# Patient Record
Sex: MALE | Race: WHITE | Employment: OTHER | URBAN - METROPOLITAN AREA
[De-identification: names, ages, dates, MRNs, and addresses within clinical notes are randomized per-mention and may not be internally consistent; named-entity substitution may affect disease eponyms.]

---

## 2018-01-29 ENCOUNTER — HOSPITAL ENCOUNTER (OUTPATIENT)
Dept: GENERAL RADIOLOGY | Age: 73
Discharge: HOME | End: 2018-01-29
Attending: FAMILY MEDICINE
Payer: MEDICARE

## 2018-01-29 DIAGNOSIS — R05.9 COUGH: ICD-10-CM

## 2018-01-29 PROCEDURE — 71046 X-RAY EXAM CHEST 2 VIEWS: CPT

## 2018-12-27 ENCOUNTER — APPOINTMENT (OUTPATIENT)
Dept: CT IMAGING | Age: 73
End: 2018-12-27
Attending: EMERGENCY MEDICINE
Payer: MEDICARE

## 2018-12-27 ENCOUNTER — HOSPITAL ENCOUNTER (OUTPATIENT)
Age: 73
Setting detail: OBSERVATION
Discharge: HOME | End: 2018-12-29
Attending: EMERGENCY MEDICINE | Admitting: INTERNAL MEDICINE
Payer: MEDICARE

## 2018-12-27 ENCOUNTER — APPOINTMENT (OUTPATIENT)
Dept: GENERAL RADIOLOGY | Age: 73
End: 2018-12-27
Attending: EMERGENCY MEDICINE
Payer: MEDICARE

## 2018-12-27 DIAGNOSIS — H53.2 DIPLOPIA: Primary | ICD-10-CM

## 2018-12-27 DIAGNOSIS — H49.01 THIRD (OCULOMOTOR) NERVE PALSY, RIGHT EYE: ICD-10-CM

## 2018-12-27 LAB
ALBUMIN SERPL-MCNC: 4 G/DL (ref 3.5–5)
ALBUMIN/GLOB SERPL: 1 {RATIO} (ref 1.1–2.2)
ALP SERPL-CCNC: 59 U/L (ref 45–117)
ALT SERPL-CCNC: 34 U/L (ref 12–78)
ANION GAP SERPL CALC-SCNC: 9 MMOL/L (ref 5–15)
AST SERPL-CCNC: 29 U/L (ref 15–37)
ATRIAL RATE: 76 BPM
BASOPHILS # BLD: 0 K/UL (ref 0–0.1)
BASOPHILS NFR BLD: 0 % (ref 0–1)
BILIRUB SERPL-MCNC: 1.6 MG/DL (ref 0.2–1)
BUN SERPL-MCNC: 14 MG/DL (ref 6–20)
BUN/CREAT SERPL: 16 (ref 12–20)
CALCIUM SERPL-MCNC: 9.3 MG/DL (ref 8.5–10.1)
CALCULATED P AXIS, ECG09: 48 DEGREES
CALCULATED R AXIS, ECG10: 56 DEGREES
CALCULATED T AXIS, ECG11: 54 DEGREES
CHLORIDE SERPL-SCNC: 103 MMOL/L (ref 97–108)
CO2 SERPL-SCNC: 25 MMOL/L (ref 21–32)
COMMENT, HOLDF: NORMAL
CREAT SERPL-MCNC: 0.9 MG/DL (ref 0.7–1.3)
DIAGNOSIS, 93000: NORMAL
DIFFERENTIAL METHOD BLD: NORMAL
EOSINOPHIL # BLD: 0 K/UL (ref 0–0.4)
EOSINOPHIL NFR BLD: 1 % (ref 0–7)
ERYTHROCYTE [DISTWIDTH] IN BLOOD BY AUTOMATED COUNT: 12.5 % (ref 11.5–14.5)
GLOBULIN SER CALC-MCNC: 4.1 G/DL (ref 2–4)
GLUCOSE SERPL-MCNC: 99 MG/DL (ref 65–100)
HCT VFR BLD AUTO: 47.5 % (ref 36.6–50.3)
HGB BLD-MCNC: 15.8 G/DL (ref 12.1–17)
IMM GRANULOCYTES # BLD: 0 K/UL (ref 0–0.04)
IMM GRANULOCYTES NFR BLD AUTO: 0 % (ref 0–0.5)
LYMPHOCYTES # BLD: 2.3 K/UL (ref 0.8–3.5)
LYMPHOCYTES NFR BLD: 35 % (ref 12–49)
MCH RBC QN AUTO: 30.4 PG (ref 26–34)
MCHC RBC AUTO-ENTMCNC: 33.3 G/DL (ref 30–36.5)
MCV RBC AUTO: 91.3 FL (ref 80–99)
MONOCYTES # BLD: 0.5 K/UL (ref 0–1)
MONOCYTES NFR BLD: 8 % (ref 5–13)
NEUTS SEG # BLD: 3.8 K/UL (ref 1.8–8)
NEUTS SEG NFR BLD: 56 % (ref 32–75)
NRBC # BLD: 0 K/UL (ref 0–0.01)
NRBC BLD-RTO: 0 PER 100 WBC
P-R INTERVAL, ECG05: 154 MS
PLATELET # BLD AUTO: 182 K/UL (ref 150–400)
PMV BLD AUTO: 9.1 FL (ref 8.9–12.9)
POTASSIUM SERPL-SCNC: 3.7 MMOL/L (ref 3.5–5.1)
PROT SERPL-MCNC: 8.1 G/DL (ref 6.4–8.2)
Q-T INTERVAL, ECG07: 408 MS
QRS DURATION, ECG06: 78 MS
QTC CALCULATION (BEZET), ECG08: 459 MS
RBC # BLD AUTO: 5.2 M/UL (ref 4.1–5.7)
SAMPLES BEING HELD,HOLD: NORMAL
SODIUM SERPL-SCNC: 137 MMOL/L (ref 136–145)
TROPONIN I SERPL-MCNC: <0.05 NG/ML
VENTRICULAR RATE, ECG03: 76 BPM
WBC # BLD AUTO: 6.7 K/UL (ref 4.1–11.1)

## 2018-12-27 PROCEDURE — 71045 X-RAY EXAM CHEST 1 VIEW: CPT

## 2018-12-27 PROCEDURE — 70450 CT HEAD/BRAIN W/O DYE: CPT

## 2018-12-27 PROCEDURE — 85025 COMPLETE CBC W/AUTO DIFF WBC: CPT

## 2018-12-27 PROCEDURE — 84484 ASSAY OF TROPONIN QUANT: CPT

## 2018-12-27 PROCEDURE — 96372 THER/PROPH/DIAG INJ SC/IM: CPT

## 2018-12-27 PROCEDURE — 93005 ELECTROCARDIOGRAM TRACING: CPT

## 2018-12-27 PROCEDURE — 74011250636 HC RX REV CODE- 250/636: Performed by: INTERNAL MEDICINE

## 2018-12-27 PROCEDURE — 80053 COMPREHEN METABOLIC PANEL: CPT

## 2018-12-27 PROCEDURE — 36415 COLL VENOUS BLD VENIPUNCTURE: CPT

## 2018-12-27 PROCEDURE — 65390000012 HC CONDITION CODE 44 OBSERVATION

## 2018-12-27 PROCEDURE — 99284 EMERGENCY DEPT VISIT MOD MDM: CPT

## 2018-12-27 PROCEDURE — 65660000000 HC RM CCU STEPDOWN

## 2018-12-27 RX ORDER — VITAMIN E 268 MG
400 CAPSULE ORAL DAILY
COMMUNITY
End: 2018-12-29

## 2018-12-27 RX ORDER — PANTOPRAZOLE SODIUM 40 MG/1
40 TABLET, DELAYED RELEASE ORAL DAILY
Status: DISCONTINUED | OUTPATIENT
Start: 2018-12-28 | End: 2018-12-29 | Stop reason: HOSPADM

## 2018-12-27 RX ORDER — TAMSULOSIN HYDROCHLORIDE 0.4 MG/1
0.4 CAPSULE ORAL DAILY
Status: DISCONTINUED | OUTPATIENT
Start: 2018-12-28 | End: 2018-12-29 | Stop reason: HOSPADM

## 2018-12-27 RX ORDER — ASPIRIN 325 MG
325 TABLET ORAL DAILY
Status: DISCONTINUED | OUTPATIENT
Start: 2018-12-28 | End: 2018-12-28

## 2018-12-27 RX ORDER — SODIUM CHLORIDE 0.9 % (FLUSH) 0.9 %
5-10 SYRINGE (ML) INJECTION AS NEEDED
Status: DISCONTINUED | OUTPATIENT
Start: 2018-12-27 | End: 2018-12-29 | Stop reason: HOSPADM

## 2018-12-27 RX ORDER — ASCORBIC ACID 500 MG
500 TABLET ORAL DAILY
COMMUNITY
End: 2018-12-29

## 2018-12-27 RX ORDER — DEXTROSE MONOHYDRATE AND SODIUM CHLORIDE 5; .45 G/100ML; G/100ML
75 INJECTION, SOLUTION INTRAVENOUS CONTINUOUS
Status: DISCONTINUED | OUTPATIENT
Start: 2018-12-27 | End: 2018-12-27

## 2018-12-27 RX ORDER — SODIUM CHLORIDE 9 MG/ML
75 INJECTION, SOLUTION INTRAVENOUS CONTINUOUS
Status: DISPENSED | OUTPATIENT
Start: 2018-12-27 | End: 2018-12-28

## 2018-12-27 RX ORDER — ACETAMINOPHEN 325 MG/1
650 TABLET ORAL
Status: DISCONTINUED | OUTPATIENT
Start: 2018-12-27 | End: 2018-12-29 | Stop reason: HOSPADM

## 2018-12-27 RX ORDER — ADHESIVE BANDAGE
30 BANDAGE TOPICAL DAILY PRN
Status: DISCONTINUED | OUTPATIENT
Start: 2018-12-27 | End: 2018-12-29 | Stop reason: HOSPADM

## 2018-12-27 RX ORDER — ONDANSETRON 2 MG/ML
4 INJECTION INTRAMUSCULAR; INTRAVENOUS
Status: DISCONTINUED | OUTPATIENT
Start: 2018-12-27 | End: 2018-12-29 | Stop reason: HOSPADM

## 2018-12-27 RX ORDER — ENOXAPARIN SODIUM 100 MG/ML
40 INJECTION SUBCUTANEOUS EVERY 24 HOURS
Status: DISCONTINUED | OUTPATIENT
Start: 2018-12-27 | End: 2018-12-29 | Stop reason: HOSPADM

## 2018-12-27 RX ORDER — ACETAMINOPHEN 650 MG/1
650 SUPPOSITORY RECTAL
Status: DISCONTINUED | OUTPATIENT
Start: 2018-12-27 | End: 2018-12-29 | Stop reason: HOSPADM

## 2018-12-27 RX ORDER — ATORVASTATIN CALCIUM 20 MG/1
20 TABLET, FILM COATED ORAL DAILY
Status: DISCONTINUED | OUTPATIENT
Start: 2018-12-28 | End: 2018-12-29 | Stop reason: HOSPADM

## 2018-12-27 RX ORDER — LANOLIN ALCOHOL/MO/W.PET/CERES
400 CREAM (GRAM) TOPICAL DAILY
COMMUNITY

## 2018-12-27 RX ORDER — SODIUM CHLORIDE 0.9 % (FLUSH) 0.9 %
5-10 SYRINGE (ML) INJECTION EVERY 8 HOURS
Status: DISCONTINUED | OUTPATIENT
Start: 2018-12-27 | End: 2018-12-29 | Stop reason: HOSPADM

## 2018-12-27 RX ORDER — LEVOTHYROXINE SODIUM 112 UG/1
112 TABLET ORAL
Status: DISCONTINUED | OUTPATIENT
Start: 2018-12-28 | End: 2018-12-29 | Stop reason: HOSPADM

## 2018-12-27 RX ADMIN — ENOXAPARIN SODIUM 40 MG: 40 INJECTION SUBCUTANEOUS at 22:30

## 2018-12-27 RX ADMIN — Medication 10 ML: at 22:30

## 2018-12-27 NOTE — ED TRIAGE NOTES
Triage Note: Patient is coming in with double vision for 3 days. Patient was seen at [address] [place] [address] and had eye dilated. Patient is being sent in for stroke work up.

## 2018-12-27 NOTE — ED PROVIDER NOTES
Petra Severino is a 72 y.o. male who presents ambulatory to the ED with a c/o double vision today. Pt denies any eye pain or redness but states that his sx began shortly after Saeed. Pt reports that today he went to see his PCP for his sx and was sent to [facility] and was told that he had Diplopia. He states that when he covers his right eye with a patch, his vision improves. Pt specifically denies chest pain, shortness of breath, n/v,d , fever, chills, numbness, tingling, abdominal pain, back pain, cough, leg swelling, dizziness or any other acute sx. Pt denies any recent travel, known sick contacts, or recent illness. PCP: Aramis Loco MD  PMHx significant for: Hypothyroidism, GERD, HLD, HTN, Arthritis, IH  PSHx significant for: CABG  Social Hx: Tobacco: Former smoker EtOH: socially Illicit drug use: none    There are no further complaints or symptoms at this time. Signed by: Ade Farrell scribing for Niles Metcalf MD on December 27th, 2018 at 18:41pm.               Past Medical History:   Diagnosis Date    Arthritis 8/20/2012    GERD (gastroesophageal reflux disease) 8/20/2012    HTN (hypertension) 8/20/2012    Hyperlipemia 8/20/2012    IH (inguinal hernia) 2/22/2015 2015, small right , reducible IH .      Thyroid disorder 8/20/2012       Past Surgical History:   Procedure Laterality Date    HX CORONARY ARTERY BYPASS GRAFT           Family History:   Problem Relation Age of Onset    Cancer Father     Cancer Maternal Uncle         prostate    Cancer Paternal Aunt     Cancer Paternal Uncle     Heart Disease Neg Hx        Social History     Socioeconomic History    Marital status: SINGLE     Spouse name: Not on file    Number of children: Not on file    Years of education: Not on file    Highest education level: Not on file   Social Needs    Financial resource strain: Not on file    Food insecurity - worry: Not on file    Food insecurity - inability: Not on file   [place] needs - medical: Not on file   [place] needs - non-medical: Not on file   Occupational History    Not on file   Tobacco Use    Smoking status: Former Smoker     Types: Cigarettes    Smokeless tobacco: Never Used   Substance and Sexual Activity    Alcohol use: Yes     Comment: Social drinker only     Drug use: Not on file    Sexual activity: Not on file   Other Topics Concern    Not on file   Social History Narrative    Not on file         ALLERGIES: Patient has no known allergies. Review of Systems   Constitutional: Negative for chills and fever. Eyes: Positive for visual disturbance. Respiratory: Negative for shortness of breath. Cardiovascular: Negative for chest pain. Gastrointestinal: Negative for abdominal pain, constipation, diarrhea and vomiting. Neurological: Negative for dizziness and light-headedness. All other systems reviewed and are negative. Vitals:    12/27/18 1612 12/27/18 1640   BP:  137/75   Pulse: 89 71   Resp:  20   Temp:  98 °F (36.7 °C)   SpO2: 98% 98%   Weight:  86.6 kg (191 lb)   Height:  5' 6\" (1.676 m)            Physical Exam   Constitutional: He is oriented to person, place, and time. He appears well-developed and well-nourished. No distress. NAD, AxOx4, speaking in complete sentences, wearing an eye patch (no double vision w/ patch on)   HENT:   Head: Normocephalic and atraumatic. Right Ear: External ear normal.   Left Ear: External ear normal.   Mouth/Throat: Oropharynx is clear and moist. No oropharyngeal exudate. Cn intact    No facial droop/ slurred speech/ tongue deviation   Eyes: Conjunctivae and EOM are normal. Pupils are equal, round, and reactive to light. Right eye exhibits no discharge. Left eye exhibits no discharge. No scleral icterus. Diplopia w/ patch removal, 'bad'; Less so w/ patch on   Neck: Normal range of motion. Neck supple.    Cardiovascular: Normal rate, regular rhythm, normal heart sounds and intact distal pulses. Exam reveals no gallop and no friction rub. No murmur heard. Pulmonary/Chest: Effort normal and breath sounds normal. No stridor. No respiratory distress. He has no wheezes. He has no rales. He exhibits no tenderness. Abdominal: Soft. Bowel sounds are normal. He exhibits no distension and no mass. There is no tenderness. There is no rebound and no guarding. nttp   Genitourinary:   Genitourinary Comments: Pt denies urinary/ Testicular/ scrotal or penile  complaints   Musculoskeletal: Normal range of motion. He exhibits no edema, tenderness or deformity. Lymphadenopathy:     He has no cervical adenopathy. Neurological: He is alert and oriented to person, place, and time. No cranial nerve deficit. Coordination normal.   pt has motor/ CV/ Sensation grossly intact to all extremities, R = L in strength;   Skin: Skin is warm and dry. Capillary refill takes less than 2 seconds. No rash noted. No erythema. Psychiatric: He has a normal mood and affect. Nursing note and vitals reviewed. MDM       Procedures    Chief Complaint   Patient presents with    Double Vision       7:31 PM  The patients presenting problems have been discussed, and they are in agreement with the care plan formulated and outlined with them. I have encouraged them to ask questions as they arise throughout their visit.     MEDICATIONS GIVEN:  Medications - No data to display    LABS REVIEWED:  Labs Reviewed   METABOLIC PANEL, COMPREHENSIVE - Abnormal; Notable for the following components:       Result Value    Bilirubin, total 1.6 (*)     Globulin 4.1 (*)     A-G Ratio 1.0 (*)     All other components within normal limits   CBC WITH AUTOMATED DIFF   SAMPLES BEING HELD   TROPONIN I       RADIOLOGY RESULTS:  The following have been ordered and reviewed:  _____________________________________________________________________  _____________________________________________________________________    EKG interpretation:   Rhythm: normal sinus rhythm; and regular . Rate (approx.): 76; Axis: normal; P wave: normal; QRS interval: normal ; ST/T wave: normal; Negative acute significant segmental elevations/ unchanged compared to 05/19/2015    PROCEDURES:        CONSULTATIONS:       PROGRESS NOTES:      DIAGNOSIS:    1. Diplopia        PLAN:  1-admit for cva rule out;       ED COURSE: The patients hospital course has been uncomplicated. 7:36 PM  Patient is being evaluated for admission to the hospital by the hospitalist.  The results of their tests and reasons for their admission have been discussed with them and/or available family. They convey agreement and understanding for the need to be admitted and for their admission diagnosis. Consultation has been made with the inpatient physician specialist for hospitalization.

## 2018-12-28 ENCOUNTER — APPOINTMENT (OUTPATIENT)
Dept: MRI IMAGING | Age: 73
End: 2018-12-28
Attending: PSYCHIATRY & NEUROLOGY
Payer: MEDICARE

## 2018-12-28 ENCOUNTER — APPOINTMENT (OUTPATIENT)
Dept: CT IMAGING | Age: 73
End: 2018-12-28
Attending: PSYCHIATRY & NEUROLOGY
Payer: MEDICARE

## 2018-12-28 ENCOUNTER — APPOINTMENT (OUTPATIENT)
Dept: MRI IMAGING | Age: 73
End: 2018-12-28
Attending: INTERNAL MEDICINE
Payer: MEDICARE

## 2018-12-28 LAB
CHOLEST SERPL-MCNC: 145 MG/DL
CRP SERPL HS-MCNC: 1.1 MG/L
ERYTHROCYTE [DISTWIDTH] IN BLOOD BY AUTOMATED COUNT: 12.6 % (ref 11.5–14.5)
ERYTHROCYTE [SEDIMENTATION RATE] IN BLOOD: 8 MM/HR (ref 0–20)
EST. AVERAGE GLUCOSE BLD GHB EST-MCNC: 131 MG/DL
HBA1C MFR BLD: 6.2 % (ref 4.2–6.3)
HCT VFR BLD AUTO: 46.5 % (ref 36.6–50.3)
HDLC SERPL-MCNC: 35 MG/DL
HDLC SERPL: 4.1 {RATIO} (ref 0–5)
HGB BLD-MCNC: 15.9 G/DL (ref 12.1–17)
LDLC SERPL CALC-MCNC: 66.2 MG/DL (ref 0–100)
LIPID PROFILE,FLP: ABNORMAL
MCH RBC QN AUTO: 31.2 PG (ref 26–34)
MCHC RBC AUTO-ENTMCNC: 34.2 G/DL (ref 30–36.5)
MCV RBC AUTO: 91.4 FL (ref 80–99)
NRBC # BLD: 0 K/UL (ref 0–0.01)
NRBC BLD-RTO: 0 PER 100 WBC
PLATELET # BLD AUTO: 187 K/UL (ref 150–400)
PMV BLD AUTO: 9.5 FL (ref 8.9–12.9)
RBC # BLD AUTO: 5.09 M/UL (ref 4.1–5.7)
T4 FREE SERPL-MCNC: 1.5 NG/DL (ref 0.8–1.5)
TRIGL SERPL-MCNC: 219 MG/DL (ref ?–150)
TSH SERPL DL<=0.05 MIU/L-ACNC: 2.75 UIU/ML (ref 0.36–3.74)
VLDLC SERPL CALC-MCNC: 43.8 MG/DL
WBC # BLD AUTO: 6.7 K/UL (ref 4.1–11.1)

## 2018-12-28 PROCEDURE — 70551 MRI BRAIN STEM W/O DYE: CPT

## 2018-12-28 PROCEDURE — G8978 MOBILITY CURRENT STATUS: HCPCS

## 2018-12-28 PROCEDURE — 70544 MR ANGIOGRAPHY HEAD W/O DYE: CPT

## 2018-12-28 PROCEDURE — 83036 HEMOGLOBIN GLYCOSYLATED A1C: CPT

## 2018-12-28 PROCEDURE — G8989 SELF CARE D/C STATUS: HCPCS

## 2018-12-28 PROCEDURE — 74011636320 HC RX REV CODE- 636/320: Performed by: RADIOLOGY

## 2018-12-28 PROCEDURE — G8979 MOBILITY GOAL STATUS: HCPCS

## 2018-12-28 PROCEDURE — 84443 ASSAY THYROID STIM HORMONE: CPT

## 2018-12-28 PROCEDURE — G8987 SELF CARE CURRENT STATUS: HCPCS

## 2018-12-28 PROCEDURE — 86141 C-REACTIVE PROTEIN HS: CPT

## 2018-12-28 PROCEDURE — 99218 HC RM OBSERVATION: CPT

## 2018-12-28 PROCEDURE — 65270000029 HC RM PRIVATE

## 2018-12-28 PROCEDURE — 96361 HYDRATE IV INFUSION ADD-ON: CPT

## 2018-12-28 PROCEDURE — 74011250636 HC RX REV CODE- 250/636: Performed by: INTERNAL MEDICINE

## 2018-12-28 PROCEDURE — 97116 GAIT TRAINING THERAPY: CPT

## 2018-12-28 PROCEDURE — 93306 TTE W/DOPPLER COMPLETE: CPT

## 2018-12-28 PROCEDURE — 93880 EXTRACRANIAL BILAT STUDY: CPT

## 2018-12-28 PROCEDURE — G8980 MOBILITY D/C STATUS: HCPCS

## 2018-12-28 PROCEDURE — 74011250637 HC RX REV CODE- 250/637: Performed by: INTERNAL MEDICINE

## 2018-12-28 PROCEDURE — 97165 OT EVAL LOW COMPLEX 30 MIN: CPT

## 2018-12-28 PROCEDURE — 65390000012 HC CONDITION CODE 44 OBSERVATION

## 2018-12-28 PROCEDURE — 74011250637 HC RX REV CODE- 250/637: Performed by: PSYCHIATRY & NEUROLOGY

## 2018-12-28 PROCEDURE — 97535 SELF CARE MNGMENT TRAINING: CPT

## 2018-12-28 PROCEDURE — 85652 RBC SED RATE AUTOMATED: CPT

## 2018-12-28 PROCEDURE — 85027 COMPLETE CBC AUTOMATED: CPT

## 2018-12-28 PROCEDURE — 96360 HYDRATION IV INFUSION INIT: CPT

## 2018-12-28 PROCEDURE — 97161 PT EVAL LOW COMPLEX 20 MIN: CPT

## 2018-12-28 PROCEDURE — 70496 CT ANGIOGRAPHY HEAD: CPT

## 2018-12-28 PROCEDURE — 84439 ASSAY OF FREE THYROXINE: CPT

## 2018-12-28 PROCEDURE — 80061 LIPID PANEL: CPT

## 2018-12-28 PROCEDURE — 74011000258 HC RX REV CODE- 258: Performed by: RADIOLOGY

## 2018-12-28 PROCEDURE — 36415 COLL VENOUS BLD VENIPUNCTURE: CPT

## 2018-12-28 PROCEDURE — G8988 SELF CARE GOAL STATUS: HCPCS

## 2018-12-28 RX ORDER — SODIUM CHLORIDE 0.9 % (FLUSH) 0.9 %
10 SYRINGE (ML) INJECTION
Status: ACTIVE | OUTPATIENT
Start: 2018-12-28 | End: 2018-12-28

## 2018-12-28 RX ORDER — GADOTERATE MEGLUMINE 376.9 MG/ML
17 INJECTION INTRAVENOUS
Status: ACTIVE | OUTPATIENT
Start: 2018-12-28 | End: 2018-12-28

## 2018-12-28 RX ORDER — CLOPIDOGREL BISULFATE 75 MG/1
75 TABLET ORAL DAILY
Status: DISCONTINUED | OUTPATIENT
Start: 2018-12-28 | End: 2018-12-29 | Stop reason: HOSPADM

## 2018-12-28 RX ORDER — SODIUM CHLORIDE 0.9 % (FLUSH) 0.9 %
10 SYRINGE (ML) INJECTION
Status: COMPLETED | OUTPATIENT
Start: 2018-12-28 | End: 2018-12-28

## 2018-12-28 RX ADMIN — SODIUM CHLORIDE 100 ML: 900 INJECTION, SOLUTION INTRAVENOUS at 15:56

## 2018-12-28 RX ADMIN — CLOPIDOGREL BISULFATE 75 MG: 75 TABLET ORAL at 16:31

## 2018-12-28 RX ADMIN — SODIUM CHLORIDE 75 ML/HR: 900 INJECTION, SOLUTION INTRAVENOUS at 00:48

## 2018-12-28 RX ADMIN — ATORVASTATIN CALCIUM 20 MG: 20 TABLET, FILM COATED ORAL at 10:22

## 2018-12-28 RX ADMIN — Medication 10 ML: at 15:56

## 2018-12-28 RX ADMIN — ENOXAPARIN SODIUM 40 MG: 40 INJECTION SUBCUTANEOUS at 22:40

## 2018-12-28 RX ADMIN — LEVOTHYROXINE SODIUM 112 MCG: 112 TABLET ORAL at 06:49

## 2018-12-28 RX ADMIN — Medication 10 ML: at 14:38

## 2018-12-28 RX ADMIN — PANTOPRAZOLE SODIUM 40 MG: 40 TABLET, DELAYED RELEASE ORAL at 10:22

## 2018-12-28 RX ADMIN — ASPIRIN 325 MG: 325 TABLET ORAL at 10:22

## 2018-12-28 RX ADMIN — Medication 10 ML: at 06:49

## 2018-12-28 RX ADMIN — TAMSULOSIN HYDROCHLORIDE 0.4 MG: 0.4 CAPSULE ORAL at 10:22

## 2018-12-28 RX ADMIN — IOPAMIDOL 100 ML: 755 INJECTION, SOLUTION INTRAVENOUS at 15:56

## 2018-12-28 RX ADMIN — Medication 10 ML: at 22:40

## 2018-12-28 NOTE — PROGRESS NOTES
Consult received and appreciated. Reviewed chart and discussed case with nsg. Nsg dysphagia screen completed and WNL and patient is tolerating a diet without difficulty. MRI negative for acute infarct and no concerns regarding speech or swallowing function. Formal SLP evaluation is not clinically indicated at this time. Please re-consult if further needs arise. Thanks. Tesfaye Alcaraz M.CD.  CCC-SLP

## 2018-12-28 NOTE — PROCEDURES
[facility]  *** FINAL REPORT ***    Name: Kaylene Murrell  MRN: [id number]    Inpatient  : 1945  HIS Order #: [id number]  [id number] [facility] Visit #: [id number]  Date: 28 Dec 2018    TYPE OF TEST: Cerebrovascular Duplex    REASON FOR TEST  Cerebrovascular accident    Right Carotid:-             Proximal               Mid                 Distal  cm/s  Systolic  Diastolic  Systolic  Diastolic  Systolic  Diastolic  CCA:    131.5      15.0                            91.0      18.0  Bulb:  ECA:    107.0  ICA:    124.0      40.0      114.0      22.0       75.0      19.0  ICA/CCA:  1.4       2.2    ICA Stenosis:    Right Vertebral:-  Finding: Antegrade  Sys:       50.0  Gloria:    Right Subclavian:    Left Carotid:-            Proximal                Mid                 Distal  cm/s  Systolic  Diastolic  Systolic  Diastolic  Systolic  Diastolic  CCA:     89.0      18.0                           105.0      20.0  Bulb:  ECA:    128.0  ICA:    138.0      23.0      120.0      22.0       75.0      16.0  ICA/CCA:  1.3       1.2    ICA Stenosis:    Left Vertebral:-  Finding: Antegrade  Sys:       60.0  Gloria:    Left Subclavian:    INTERPRETATION/FINDINGS  PROCEDURE:  Color duplex ultrasound imaging of extracranial  cerebrovascular arteries. FINDINGS:       Right:  Internal carotid velocity is elevated to a level  consistent with borderline 50%  percent stenosis; there is narrowing  of the flow channel on color Doppler imaging and mixed density plaque  on B-mode imaging. The common and external carotid arteries are  patent and without evidence of hemodynamically significant stenosis. Left:  Internal carotid velocity is elevated to a level  consistent with a 50 to 69 percent stenosis; there is narrowing of the   flow channel on color Doppler imaging and mixed density plaque on  B-mode imaging.   The common and external carotid arteries are patent  and without evidence of hemodynamically significant stenosis. IMPRESSION:  Consistent with borderline 50% stenosis of the right  internal carotid and 50-69% stenosis (lower end) of the left internal  carotid. Vertebrals are patent with antegrade flow. ADDITIONAL COMMENTS    I have personally reviewed the data relevant to the interpretation of  this  study.     TECHNOLOGIST: Clovis Daigle RVT  Signed: 12/28/2018 08:42 AM    PHYSICIAN: Karena Frias MD  Signed: 12/28/2018 02:08 PM

## 2018-12-28 NOTE — PROGRESS NOTES
Meenakshi Knox, Shekhar Huerta, and ShayAdmit Date: 12/27/2018 Subjective:  
 
Patient admitted for his diplopia as his eye doctor very concerned about an intracerebral event based on eye exam. He feels OK today. No change in diplopia. .. Current Facility-Administered Medications Medication Dose Route Frequency  aspirin tablet 325 mg  325 mg Oral DAILY  atorvastatin (LIPITOR) tablet 20 mg  20 mg Oral DAILY  levothyroxine (SYNTHROID) tablet 112 mcg  112 mcg Oral ACB  pantoprazole (PROTONIX) tablet 40 mg  40 mg Oral DAILY  tamsulosin (FLOMAX) capsule 0.4 mg  0.4 mg Oral DAILY  sodium chloride (NS) flush 5-10 mL  5-10 mL IntraVENous Q8H  
 sodium chloride (NS) flush 5-10 mL  5-10 mL IntraVENous PRN  
 acetaminophen (TYLENOL) tablet 650 mg  650 mg Oral Q4H PRN Or  
 acetaminophen (TYLENOL) solution 650 mg  650 mg Per NG tube Q4H PRN Or  
 acetaminophen (TYLENOL) suppository 650 mg  650 mg Rectal Q4H PRN  
 0.9% sodium chloride infusion  75 mL/hr IntraVENous CONTINUOUS  
 ondansetron (ZOFRAN) injection 4 mg  4 mg IntraVENous Q6H PRN  
 magnesium hydroxide (MILK OF MAGNESIA) 400 mg/5 mL oral suspension 30 mL  30 mL Oral DAILY PRN  
 enoxaparin (LOVENOX) injection 40 mg  40 mg SubCUTAneous Q24H Objective:  
 
Patient Vitals for the past 8 hrs: 
 BP Temp Pulse Resp SpO2 Weight 12/28/18 0650 126/63 98.5 °F (36.9 °C) 85 13 98 %   
12/28/18 0255 136/69 98.1 °F (36.7 °C) 78 18 98 % 181 lb 8 oz (82.3 kg) No intake/output data recorded. No intake/output data recorded. Physical Exam: Lungs: clear to auscultation bilaterally Heart: regular rate and rhythm, S1, S2 normal, no murmur, click, rub or gallop Abdomen: soft, non-tender. Bowel sounds normal. No masses,  no organomegaly Data Review Recent Results (from the past 24 hour(s)) EKG, 12 LEAD, INITIAL Collection Time: 12/27/18  4:26 PM  
Result Value Ref Range  Ventricular Rate 76 BPM  
 Atrial Rate 76 BPM  
 P-R Interval 154 ms QRS Duration 78 ms Q-T Interval 408 ms QTC Calculation (Bezet) 459 ms Calculated P Axis 48 degrees Calculated R Axis 56 degrees Calculated T Axis 54 degrees Diagnosis Normal sinus rhythm No previous ECGs available Confirmed by Christy Mckay ([id number]) on 12/27/2018 5:31:19 PM 
  
CBC WITH AUTOMATED DIFF Collection Time: 12/27/18  4:36 PM  
Result Value Ref Range WBC 6.7 4.1 - 11.1 K/uL  
 RBC 5.20 4. 10 - 5.70 M/uL  
 HGB 15.8 12.1 - 17.0 g/dL HCT 47.5 36.6 - 50.3 % MCV 91.3 80.0 - 99.0 FL  
 MCH 30.4 26.0 - 34.0 PG  
 MCHC 33.3 30.0 - 36.5 g/dL  
 RDW 12.5 11.5 - 14.5 % PLATELET 469 097 - 009 K/uL MPV 9.1 8.9 - 12.9 FL  
 NRBC 0.0 0  WBC ABSOLUTE NRBC 0.00 0.00 - 0.01 K/uL NEUTROPHILS 56 32 - 75 % LYMPHOCYTES 35 12 - 49 % MONOCYTES 8 5 - 13 % EOSINOPHILS 1 0 - 7 % BASOPHILS 0 0 - 1 % IMMATURE GRANULOCYTES 0 0.0 - 0.5 % ABS. NEUTROPHILS 3.8 1.8 - 8.0 K/UL  
 ABS. LYMPHOCYTES 2.3 0.8 - 3.5 K/UL  
 ABS. MONOCYTES 0.5 0.0 - 1.0 K/UL  
 ABS. EOSINOPHILS 0.0 0.0 - 0.4 K/UL  
 ABS. BASOPHILS 0.0 0.0 - 0.1 K/UL  
 ABS. IMM. GRANS. 0.0 0.00 - 0.04 K/UL  
 DF AUTOMATED METABOLIC PANEL, COMPREHENSIVE Collection Time: 12/27/18  4:36 PM  
Result Value Ref Range Sodium 137 136 - 145 mmol/L Potassium 3.7 3.5 - 5.1 mmol/L Chloride 103 97 - 108 mmol/L  
 CO2 25 21 - 32 mmol/L Anion gap 9 5 - 15 mmol/L Glucose 99 65 - 100 mg/dL BUN 14 6 - 20 MG/DL Creatinine 0.90 0.70 - 1.30 MG/DL  
 BUN/Creatinine ratio 16 12 - 20 GFR est AA >60 >60 ml/min/1.73m2 GFR est non-AA >60 >60 ml/min/1.73m2 Calcium 9.3 8.5 - 10.1 MG/DL Bilirubin, total 1.6 (H) 0.2 - 1.0 MG/DL  
 ALT (SGPT) 34 12 - 78 U/L  
 AST (SGOT) 29 15 - 37 U/L Alk. phosphatase 59 45 - 117 U/L Protein, total 8.1 6.4 - 8.2 g/dL Albumin 4.0 3.5 - 5.0 g/dL Globulin 4.1 (H) 2.0 - 4.0 g/dL A-G Ratio 1.0 (L) 1.1 - 2.2 SAMPLES BEING HELD Collection Time: 12/27/18  4:36 PM  
Result Value Ref Range SAMPLES BEING HELD 1RED COMMENT Add-on orders for these samples will be processed based on acceptable specimen integrity and analyte stability, which may vary by analyte. TROPONIN I Collection Time: 12/27/18  4:36 PM  
Result Value Ref Range Troponin-I, Qt. <0.05 <0.05 ng/mL LIPID PANEL Collection Time: 12/28/18  3:13 AM  
Result Value Ref Range LIPID PROFILE Cholesterol, total 145 <200 MG/DL Triglyceride 219 (H) <150 MG/DL  
 HDL Cholesterol 35 MG/DL  
 LDL, calculated 66.2 0 - 100 MG/DL VLDL, calculated 43.8 MG/DL  
 CHOL/HDL Ratio 4.1 0 - 5.0 HEMOGLOBIN A1C WITH EAG Collection Time: 12/28/18  3:13 AM  
Result Value Ref Range Hemoglobin A1c 6.2 4.2 - 6.3 % Est. average glucose 131 mg/dL CBC W/O DIFF Collection Time: 12/28/18  3:13 AM  
Result Value Ref Range WBC 6.7 4.1 - 11.1 K/uL  
 RBC 5.09 4. 10 - 5.70 M/uL  
 HGB 15.9 12.1 - 17.0 g/dL HCT 46.5 36.6 - 50.3 % MCV 91.4 80.0 - 99.0 FL  
 MCH 31.2 26.0 - 34.0 PG  
 MCHC 34.2 30.0 - 36.5 g/dL  
 RDW 12.6 11.5 - 14.5 % PLATELET 127 221 - 703 K/uL MPV 9.5 8.9 - 12.9 FL  
 NRBC 0.0 0  WBC ABSOLUTE NRBC 0.00 0.00 - 0.01 K/uL SED RATE (ESR) Collection Time: 12/28/18  3:13 AM  
Result Value Ref Range Sed rate, automated 8 0 - 20 mm/hr CRP, HIGH SENSITIVITY Collection Time: 12/28/18  3:13 AM  
Result Value Ref Range CRP, High sensitivity 1.1 mg/L  
TSH 3RD GENERATION Collection Time: 12/28/18  3:13 AM  
Result Value Ref Range TSH 2.75 0.36 - 3.74 uIU/mL T4, FREE Collection Time: 12/28/18  3:13 AM  
Result Value Ref Range T4, Free 1.5 0.8 - 1.5 NG/DL Assessment:  
 
Active Problems: 
  Double vision (12/27/2018) Plan:  
 
1) MRI brain today 2) Neurologist to see

## 2018-12-28 NOTE — PROGRESS NOTES
Problem: Falls - Risk of 
Goal: *Absence of Falls Document Payton Primes Fall Risk and appropriate interventions in the flowsheet. Outcome: Progressing Towards Goal 
Fall Risk Interventions: 
  
 
  
 
Medication Interventions: Patient to call before getting OOB, Evaluate medications/consider consulting pharmacy Elimination Interventions: Patient to call for help with toileting needs Problem: Pressure Injury - Risk of 
Goal: *Prevention of pressure injury Document Angel Scale and appropriate interventions in the flowsheet. Outcome: Progressing Towards Goal 
Pressure Injury Interventions: Activity Interventions: Increase time out of bed, Pressure redistribution bed/mattress(bed type), PT/OT evaluation

## 2018-12-28 NOTE — H&P
[address]   HISTORY AND PHYSICAL      Name:RAFAL CAMPO  MR#: [id number]  : 1945  ACCOUNT #: [de-identified]   ADMIT DATE: 2018    TIME OF DICTATION:  2000 hours. PRIMARY CARE PHYSICIAN:  Dr. Francisco Richard. CHIEF COMPLAINT:  Double vision. HISTORY OF PRESENT ILLNESS:  This 61-year-old gentleman with a past medical history significant for primary hypertension, hypothyroidism, dyslipidemia, gastroesophageal reflux disease and arthritis, was referred to the emergency room by his primary care physician for evaluation of  history of double vision of the right eye. The patient noted the symptoms started approximately 4 days prior to the emergency room presentation, went to get further evaluation by his PCP and was informed by  that he had diplopia. Patient also complained of being unsteady whilst walking and as a result was prescribed a right eye patch with some improved vision. The patient denied any headaches, dizziness, chest pains, palpitations, shortness of breath, nausea, vomiting, abdominal pain, bladder or bowel irregularities. PAST MEDICAL HISTORY:  1.  Primary hypertension. 2.  Dyslipidemia. 3.  Hypothyroidism. 4.  Arthritis. 5.  Gastroesophageal reflux disease. PAST SURGICAL HISTORY:  Coronary artery bypass graft surgery. HOME MEDICATIONS:  1. Aspirin 325 mg p.o. b.i.d.  2.  Lipitor 20 mg p.o. daily. 3.  Voltaren 75 mg enteric coated 1 tablet p.o. twice a day as needed. 4.  Guaifenesin/codeine 5 mL p.o. 3 times a day as needed for cough. 5.  Levothyroxine 112 mcg p.o. daily. 6.  Multivitamin 1 tablet p.o. daily. 7.  Omega-3 fatty acids 1 capsule p.o. daily. 8.  Protonix 40 mg 1 tablet p.o. daily. 9.  Flomax 0.4 mg 1 capsule p.o. daily. 10.  Tramadol 50 mg 1 tablet every 6 hours as needed for pain. ALLERGIES:  PATIENT HAS NO KNOWN DRUG ALLERGIES.     SOCIAL HISTORY:  Significant for living at home, is independent of activities and instrumental activities of daily living, is a former smoker. Denies any alcohol use disorder. FAMILY HISTORY:  Reviewed and positive for diabetes on the maternal and paternal sides of the family. REVIEW OF SYSTEMS:  GENERAL:  No recent changes in appetite or weight loss. CONSTITUTIONAL:  No fevers or chills. HEENT:   Positive for double vision in the right eye. CARDIOVASCULAR:  Negative for chest pains or palpitations. RESPIRATORY:  Negative for cough or shortness of breath. GASTROINTESTINAL:  Negative for abdominal pain, nausea, vomiting. GENITOURINARY:  Negative for hematuria, dysuria. MUSCULOSKELETAL:  Negative for any joint pains or stiffness. HEMATOLOGY:  Negative for anemia or bleeding diathesis. NEUROLOGIC:  Negative for any dizziness or generalized weakness. PSYCHIATRIC:  Negative for psychosis, hallucinations or delusions. PHYSICAL EXAMINATION:  GENERAL:  Patient was awake, alert, sitting up in the stretcher. VITAL SIGNS:  Blood pressure 137/75, heart rate 71, respiratory rate 20, afebrile, saturating 98% on room air. HEENT:   Head normocephalic. Pupils equal and reactive to light without any nystagmus. ENT:  Ears, nose, throat clear. NECK:  No jugular venous distention, no carotid bruits. HEART:  S1, S2 present without any murmurs. RESPIRATORY:  Lungs with decreased air entry bilaterally. GASTROINTESTINAL:  Bowel sounds present. The abdomen is soft, nontender, no rebound, no guarding. GENITOURINARY:  Nil of note  MUSCULOSKELETAL:  Power 5/5 in bilateral upper and lower extremities. No significant asymmetry of the lower extremities. No significant pedal edema. CENTRAL NERVOUS SYSTEM:  The patient was awake, alert, oriented to time, place, person. Coordination was slightly impaired. Gait was not assessed. LABORATORY DATA AND RADIOGRAPHIC FINDINGS:  CT of the head without contrast which did not show any acute intracranial findings.   Metabolic panel with a sodium of 137, potassium 3.7, chloride 103, bicarb 25, BUN 14, creatinine 0.9, glucose of 99. Hematology:  CBC with a WBC of 6.7, hemoglobin 15.8, hematocrit 47.5, platelet count 051. ASSESSMENT:  1. Central nervous system:  The patient will be admitted to the inpatient telemetry floor for evaluation of possible CVA with right-sided diplopia. CT of the brain with no contrast negative for any acute findings. We will obtain further neurologic evaluation including MRI of the brain, carotid Dopplers. We will continue aspirin, statin, close neurologic checks and consult neurology service for further recommendations. 2.  Cardiovascular:  Fairly well controlled primary hypertension. Dyslipidemia. 2D ECHO to evaluate LV function. 3.  Gastrointestinal:  History of gastroesophageal reflux disease. 4.  Endocrine:  Hypothyroidism, on levothyroxine. Will check a TSH and adjust dose accordingly. 5.  Musculoskeletal:  Arthritis. 6.  Prophylaxis for DVT. 7.  Directives:  FULL CODE with a guarded prognosis. Discussed with the patient. In summary, a 70-year-old gentleman with a past medical history significant for primary hypertension, coronary artery disease, hypothyroidism, gastroesophageal reflux disease, and arthritis, is being admitted to the inpatient level  telemetry floor for management of probable CVA with residual right-sided diplopia. The patient is at increased risk for further decompensation and will benefit from inpatient management including with an MRI of the brain, carotid Dopplers, 2D echo and Neurology consult. The entire admission plan discussed in detail with the patient and patient's family member at the bedside. All questions and concerns were addressed. TOTAL TIME FOR ADMISSION:  45 minutes of which at least 50% of the time was spent in plan of care and counseling of the patient.       MD FRANCISCO Hummel/  D: 12/27/2018 20:09     T: 12/27/2018 20:44  JOB #: [id number]

## 2018-12-28 NOTE — ROUTINE PROCESS
TRANSFER - OUT REPORT:    Verbal report given to [facility] RN(name) on Petra Brand  being transferred to 665(unit) for routine progression of care       Report consisted of patients Situation, Background, Assessment and   Recommendations(SBAR). Information from the following report(s) SBAR, Kardex, ED Summary, Procedure Summary, Intake/Output, MAR, Recent Results and Med Rec Status was reviewed with the receiving nurse. Lines:   Peripheral IV Left Antecubital (Active)   Site Assessment Clean, dry, & intact 12/27/2018  4:39 PM   Phlebitis Assessment 0 12/27/2018  4:39 PM   Infiltration Assessment 0 12/27/2018  4:39 PM   Dressing Status Clean, dry, & intact 12/27/2018  4:39 PM   Dressing Type Tape;Transparent 12/27/2018  4:39 PM   Hub Color/Line Status Pink;Capped;Flushed;Patent 12/27/2018  4:39 PM   Action Taken Blood drawn 12/27/2018  4:39 PM   Alcohol Cap Used No 12/27/2018  4:39 PM        Opportunity for questions and clarification was provided.       Patient transported with:   Registered Nurse

## 2018-12-28 NOTE — PROGRESS NOTES
Problem: Falls - Risk of 
Goal: *Absence of Falls Document Nathalie Shows Fall Risk and appropriate interventions in the flowsheet. Outcome: Progressing Towards Goal 
Fall Risk Interventions: 
  
 
  
 
Medication Interventions: Evaluate medications/consider consulting pharmacy, Patient to call before getting OOB, Teach patient to arise slowly Elimination Interventions: Patient to call for help with toileting needs, Toileting schedule/hourly rounds Problem: Pressure Injury - Risk of 
Goal: *Prevention of pressure injury Document Angel Scale and appropriate interventions in the flowsheet. Outcome: Progressing Towards Goal 
Pressure Injury Interventions: 
Sensory Interventions: Assess changes in LOC, Assess need for specialty bed, Minimize linen layers Activity Interventions: Increase time out of bed, Pressure redistribution bed/mattress(bed type), PT/OT evaluation

## 2018-12-28 NOTE — ED NOTES
Bedside shift change report given to Cuca Benton RN (oncoming nurse) by Alfredo Crandlal RN  (offgoing nurse). Report included the following information SBAR, Kardex, ED Summary, Intake/Output, MAR and Recent Results.

## 2018-12-28 NOTE — CONSULTS
NEUROLOGY  12/28/2018     Consulted by: Payton Beck MD        Patient ID:  Chhaya Watts  [id number]  72 y.o.  1945    Chief Complaint   Patient presents with    Double Vision       HPI    79-year-old gentleman with history of CABG, hypertension, dyslipidemia who is here for double vision that started the morning of December 25. He tells me he went to [facility] and did not feel very good. He woke up the next morning and suddenly had double vision mostly vertical with occasional diagonal symptoms. Symptoms resolved when he closes one eye. No numbness or weakness or speech change. He feels a little unstable on. No difficulty swallowing. He takes aspirin 325 every day as well as Lipitor. MRI brain was done which was negative for any acute process seen. MRI cannot be done due to poor tolerance of the procedure. Symptoms have not improved since onset. Review of Systems   Constitutional: Positive for malaise/fatigue. Eyes: Positive for double vision. Gastrointestinal: Negative for nausea. Neurological: Negative for sensory change, speech change and focal weakness. All other systems reviewed and are negative. Past Medical History:   Diagnosis Date    Arthritis 8/20/2012    GERD (gastroesophageal reflux disease) 8/20/2012    HTN (hypertension) 8/20/2012    Hyperlipemia 8/20/2012    IH (inguinal hernia) 2/22/2015 2015, small right , reducible IH .      Thyroid disorder 8/20/2012     Family History   Problem Relation Age of Onset    Cancer Father     Cancer Maternal Uncle         prostate    Cancer Paternal Aunt     Cancer Paternal Uncle     Heart Disease Neg Hx      Social History     Socioeconomic History    Marital status: SINGLE     Spouse name: Not on file    Number of children: Not on file    Years of education: Not on file    Highest education level: Not on file   Social Needs    Financial resource strain: Not on file    Food insecurity - worry: Not on file    Food insecurity - inability: Not on file    Transportation needs - medical: Not on file   [employer] needs - non-medical: Not on file   Occupational History    Not on file   Tobacco Use    Smoking status: Former Smoker     Types: Cigarettes    Smokeless tobacco: Never Used   Substance and Sexual Activity    Alcohol use: Yes     Comment: Social drinker only     Drug use: Not on file    Sexual activity: Not on file   Other Topics Concern    Not on file   Social History Narrative    Not on file     Current Facility-Administered Medications   Medication Dose Route Frequency    gadoterate meglumine (DOTAREM) 0.5 mmol/mL (376.9 mg/mL) contrast solution 17 mL  17 mL IntraVENous RAD ONCE    sodium chloride (NS) flush 10 mL  10 mL IntraVENous RAD ONCE    aspirin tablet 325 mg  325 mg Oral DAILY    atorvastatin (LIPITOR) tablet 20 mg  20 mg Oral DAILY    levothyroxine (SYNTHROID) tablet 112 mcg  112 mcg Oral ACB    pantoprazole (PROTONIX) tablet 40 mg  40 mg Oral DAILY    tamsulosin (FLOMAX) capsule 0.4 mg  0.4 mg Oral DAILY    sodium chloride (NS) flush 5-10 mL  5-10 mL IntraVENous Q8H    sodium chloride (NS) flush 5-10 mL  5-10 mL IntraVENous PRN    acetaminophen (TYLENOL) tablet 650 mg  650 mg Oral Q4H PRN    Or    acetaminophen (TYLENOL) solution 650 mg  650 mg Per NG tube Q4H PRN    Or    acetaminophen (TYLENOL) suppository 650 mg  650 mg Rectal Q4H PRN    0.9% sodium chloride infusion  75 mL/hr IntraVENous CONTINUOUS    ondansetron (ZOFRAN) injection 4 mg  4 mg IntraVENous Q6H PRN    magnesium hydroxide (MILK OF MAGNESIA) 400 mg/5 mL oral suspension 30 mL  30 mL Oral DAILY PRN    enoxaparin (LOVENOX) injection 40 mg  40 mg SubCUTAneous Q24H     No Known Allergies    Visit Vitals  /68 (BP 1 Location: Right arm, BP Patient Position: At rest)   Pulse 82   Temp 97.7 °F (36.5 °C)   Resp 20   Ht 5' 6\" (1.676 m)   Wt 82.1 kg (181 lb)   SpO2 96%   BMI 29.21 kg/m²     Physical Exam   Constitutional: He appears well-developed and well-nourished. Cardiovascular: Normal rate. Pulmonary/Chest: Effort normal.   Skin: Skin is warm. Psychiatric: He has a normal mood and affect. His behavior is normal.   Vitals reviewed. Neurologic Exam     Mental Status   Pleasant elderly gentleman very talkative  Pupils are symmetric and reactive  Extraocular testing revealed a right third palsy  Face is symmetric  Tongue is midline  Speech is clear  5/5 motor throughout  Sensation grossly intact  Gait steady but slow and cautious            Lab Results   Component Value Date/Time    WBC 6.7 12/28/2018 03:13 AM    WBC (POC) 6.4 01/29/2018 02:04 PM    HGB 15.9 12/28/2018 03:13 AM    HGB (POC) 14.5 01/29/2018 02:04 PM    HCT 46.5 12/28/2018 03:13 AM    HCT (POC) 43.4 01/29/2018 02:04 PM    PLATELET 946 87/91/2402 03:13 AM    PLATELET (POC) 704 38/09/5759 02:04 PM    MCV 91.4 12/28/2018 03:13 AM    MCV (POC) 90.8 01/29/2018 02:04 PM     Lab Results   Component Value Date/Time    Hemoglobin A1c 6.2 12/28/2018 03:13 AM    Glucose 99 12/27/2018 04:36 PM    LDL, calculated 66.2 12/28/2018 03:13 AM    Creatinine 0.90 12/27/2018 04:36 PM      Lab Results   Component Value Date/Time    Cholesterol, total 145 12/28/2018 03:13 AM    Cholesterol (POC) 118 08/10/2017 09:23 AM    HDL Cholesterol 35 12/28/2018 03:13 AM    LDL, calculated 66.2 12/28/2018 03:13 AM    LDL Cholesterol (POC) 55 08/10/2017 09:23 AM    Triglyceride 219 (H) 12/28/2018 03:13 AM    Triglycerides (POC) 135 08/10/2017 09:23 AM    CHOL/HDL Ratio 4.1 12/28/2018 03:13 AM     Lab Results   Component Value Date/Time    ALT (SGPT) 34 12/27/2018 04:36 PM    AST (SGOT) 29 12/27/2018 04:36 PM    Alk.  phosphatase 59 12/27/2018 04:36 PM    Bilirubin, total 1.6 (H) 12/27/2018 04:36 PM    Albumin 4.0 12/27/2018 04:36 PM    Protein, total 8.1 12/27/2018 04:36 PM    PLATELET 396 19/53/5844 03:13 AM    PLATELET (POC) 563 50/51/4863 02:04 PM        CT Results (maximum last 3): Results from [place] encounter on 12/27/18   CT HEAD WO CONT    Narrative INDICATION: double vision    EXAM: CT HEAD without contrast.   CT dose reduction was achieved through use of a standardized protocol tailored  for this examination and automatic exposure control for dose modulation. FINDINGS: Unenhanced CT Head is performed. The brain parenchyma is unremarkable  in appearance for age, without evidence for infarct. There is no bleed, mass,  shift, hydrocephalus or extra-axial fluid collection. Bone windows are  unremarkable. Impression IMPRESSION: No Intracranial Disease Evident on Head CT. MRI Results (maximum last 3): Results from [place] encounter on 12/27/18   MRI BRAIN WO CONT    Narrative INDICATION:   right eye diplopia     EXAMINATION:  MRI BRAIN WO CONTRAST    COMPARISON: CT head yesterday    TECHNIQUE:  MR imaging of the brain was performed with sagittal T1, axial T1,  T2, FLAIR, GRE, DWI/ADC, coronal T2. Noncontrast thin slice coronal and axial T1  and coronal STIR through the orbits was performed. Patient could not tolerate  additional imaging for postcontrast sequences. FINDINGS:      Ventricles:  Midline, no hydrocephalus. Brain Parenchyma/Brainstem:  Minimal chronic supratentorial white matter  disease. Small areas of T2 hyperintensity within the left anterior temporal lobe  white matter. No acute infarction. Intracranial Hemorrhage:  No acute hemorrhage. Few punctate foci of  susceptibility artifact involving the left frontal lobe at the vertex, right  parietal lobe, and bilateral occipital lobes, suggest prior microhemorrhage. Basal Cisterns:  Normal.   Flow Voids:  Normal.  Additional Comments:  Noncontrast evaluation of the orbits demonstrates  symmetric size and signal of the extraocular muscles, optic nerve sheath  complexes. No intraorbital inflammation. Impression IMPRESSION:    1.  No intraorbital abnormality on noncontrast sequences as above. If the patient  returns for completion of the MRI as well as postcontrast imaging, an addendum  will be generated. 2. Mild chronic white matter disease in the supratentorial brain with no acute  infarction. Few foci of remote microhemorrhage as above. 3. Nonspecific T2 hyperintensity anterior left temporal lobe white matter, could  be chronic although may also be assessed on subsequent imaging performed. VAS/US/Carotid Doppler Results (maximum last 3): Results from [place] encounter on 12/27/18   DUPLEX CAROTID BILATERAL       PET Results (maximum last 3): No results found for this or any previous visit. 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 Typically this is due to stroke and in his case its possible of the infarct is so small that it cannot be clearly visualized on the MRI as this would localize to the brainstem. It is reassuring that this is pupil sparing however a vascular study needs to be done. He did not tolerate MRA so a CTA of the head. If that is without acute issue then he can be discharged once he is medically cleared. He will need to follow-up with ophthalmology. Sometimes the symptoms can improve over time. I am going to change aspirin to Plavix monotherapy as I would consider this in aspirin failure. I will follow peripherally. Please call if needed. During this evaluation, we also discussed stroke education to include signs and symptoms of stroke and TIA.        [address] [place] [address], DO  NEUROLOGIST  Diplomate LILIA  12/28/2018

## 2018-12-28 NOTE — ROUTINE PROCESS
Bedside shift change report given to Krista Metcalf (oncoming nurse) by Germán (offgoing nurse). Report included the following information SBAR, Kardex, Procedure Summary, Accordion, Recent Results and Cardiac Rhythm NSR.

## 2018-12-28 NOTE — PROGRESS NOTES
Physical Therapy: 
 
Orders received and chart reviewed. Attempted PT evaluation. Pt off the floor for MRI at this time. Will defer and continue to follow. Thank you Butch Lyon, PT, DPT

## 2018-12-28 NOTE — PROGRESS NOTES
physical Therapy neuro EVALUATION/discharge Patient: Amelia Moses (77 y.o. male) Date: 12/28/2018 Primary Diagnosis: Double vision Precautions:   Fall ASSESSMENT : 
Based on the objective data described below, the patient presents with reports of double vision. Pt received seated EOB with eye patch in place and OT in room. Pt reported prior to admission he was independent, ambulatory without AD, goes to the [facility] regularly, drives, lives with a [place] friend and her sister\" in a 2 story home. Pt denied falls. Pt tolerated evaluation well. Pt demonstrated equal strength and normal coordination and sensation in bilateral LEs. Pt completed sit<>stand independently and ambulated around the unit without difficulty. Pt scored a 55/56 on the Nagy Balance Score indicating low falls risk. Pt educated on BEFAST acronym. Pt does not require further acute PT needs at this time. Recommend patient follow up with outpatient vision therapy to address continued reports of double vision. . 
 
Skilled physical therapy is not indicated at this time. PLAN : 
Discharge Recommendations: Outpatient Vision Therapy Further Equipment Recommendations for Discharge: none SUBJECTIVE:  
Patient stated I feel all right. Just my vision isn't normal. OBJECTIVE DATA SUMMARY:  
HISTORY:   
Past Medical History:  
Diagnosis Date  Arthritis 8/20/2012  GERD (gastroesophageal reflux disease) 8/20/2012  
 HTN (hypertension) 8/20/2012  Hyperlipemia 8/20/2012  IH (inguinal hernia) 2/22/2015 2015, small right , reducible IH .  Thyroid disorder 8/20/2012 Past Surgical History:  
Procedure Laterality Date  HX CORONARY ARTERY BYPASS GRAFT Prior Level of Function/Home Situation: see above Personal factors and/or comorbidities impacting plan of care:  
 
Home Situation Home Environment: Private residence # Steps to Enter: 4 Rails to Enter: Yes Hand Rails : Right One/Two Story Residence: Two story # of Interior Steps: 15 Height of Each Step (in): 2 inches Interior Rails: Both Lift Chair Available: No 
Living Alone: No 
Support Systems: Friends \ neighbors, Spouse/Significant Other/Partner Patient Expects to be Discharged to[de-identified] Private residence Current DME Used/Available at Home: None EXAMINATION/PRESENTATION/DECISION MAKING: Critical Behavior: 
Neurologic State: Alert Orientation Level: Oriented X4 Cognition: Appropriate safety awareness, Appropriate for age attention/concentration, Appropriate decision making, Follows commands Safety/Judgement: Awareness of environment, Insight into deficits Hearing: Auditory Auditory Impairment: None Skin:   
Edema:  
Range Of Motion: 
AROM: Within functional limits PROM: Within functional limits Strength:   
Strength: Within functional limits Tone & Sensation:  
Tone: Normal 
  
  
  
  
Sensation: Intact Coordination: 
Coordination: Within functional limits Vision:  
Tracking: Able to track stimulus in all quadrants w/o difficulty(eyes assessed separately, with eye patch) Visual Fields: (able to detect stimuli in all fields) Diplopia: Yes(resolving with eye patch) Acuity: Within Defined Limits;Able to read clock/calendar on wall without difficulty; Able to read employee name badge without difficulty(eyes assessed separately, with eye patch) Corrective Lenses: Glasses(eye patch) Functional Mobility: 
Bed Mobility: 
  
Supine to Sit: Independent Sit to Supine: Independent Transfers: 
Sit to Stand: Independent Stand to Sit: Independent Balance:  
Sitting: Intact Standing: Impaired Standing - Static: Good Standing - Dynamic : Fair Ambulation/Gait Training: 
Distance (ft): 350 Feet (ft) Assistive Device: Gait belt Base of Support: Widened Functional Measure: 
Allie Núñezson Balance Test: 
 
Sitting to Standin Standing Unsupported: 4 Sitting with Back Unsupported: 4 Standing to Sittin Transfers: 4 Standing Unsupported with Eyes Closed: 4 Standing Unsupported with Feet Together: 4 Reach Forward with Outstretched Arm: 4  Object: 4 Turn to Look Over Shoulders: 4 Turn 360 Degrees: 4 Alternate Foot on Step/Stool: 4 Standing Unsupported One Foot in Front: 3 Stand on One Le Total: 55 
 
 
 
56=Maximum possible score;  
0-20=High fall risk 21-40=Moderate fall risk 41-56=Low fall risk Nagy Balance Test and G-code impairment scale: 
Percentage of Impairment CH 
 
0% 
 CI 
 
1-19% CJ 
 
20-39% CK 
 
40-59% CL 
 
60-79% CM 
 
80-99% CN  
 
100% Adriana Garay Score 0-56 56 45-55 34-44 23-33 12-22 1-11 0  
 
G codes: In compliance with CMSs Claims Based Outcome Reporting, the following G-code set was chosen for this patient based on their primary functional limitation being treated: The outcome measure chosen to determine the severity of the functional limitation was the [facility] Score with a score of 55/56 which was correlated with the impairment scale. ? Mobility - Walking and Moving Around:  
  - CURRENT STATUS: CI - 1%-19% impaired, limited or restricted  - GOAL STATUS: CI - 1%-19% impaired, limited or restricted  - D/C STATUS:  CI - 1%-19% impaired, limited or restricted Based on the above components, the patient evaluation is determined to be of the following complexity level: LOW Pain: 
Pain Scale 1: Numeric (0 - 10) Pain Intensity 1: 0 Activity Tolerance:  
Good. vss Please refer to the flowsheet for vital signs taken during this treatment. After treatment:  
[]         Patient left in no apparent distress sitting up in chair 
[x]         Patient left in no apparent distress in bed 
[x]         Call bell left within reach [x]         Nursing notified 
[]         Caregiver present 
[]         Bed alarm activated COMMUNICATION/EDUCATION:  
 Patient was educated regarding His deficit(s) of double vision as this relates to His diagnosis of rule out CVA. He demonstrated Good understanding Patient and/or family was verbally educated on the BE FAST acronym for signs/symptoms of CVA and TIA. BE FAST was written on patient's communication board  for visual education and reinforcement. All questions answered with patient indicating good understanding. [x]   Fall prevention education was provided and the patient/caregiver indicated understanding. [x]   Patient/family have participated as able and agree with findings and recommendations. []   Patient is unable to participate in plan of care at this time. Findings and recommendations were discussed with: Occupational Therapist and Registered Nurse Thank you for this referral. 
Candace New, PT, DPT Time Calculation: 20 mins

## 2018-12-28 NOTE — PROGRESS NOTES
Admission Medication Reconciliation:    Information obtained from: pt/ Rx Query    Significant PMH/Disease States:   Past Medical History:   Diagnosis Date    Arthritis 8/20/2012    GERD (gastroesophageal reflux disease) 8/20/2012    HTN (hypertension) 8/20/2012    Hyperlipemia 8/20/2012    IH (inguinal hernia) 2/22/2015    2015, small right , reducible IH .  Thyroid disorder 8/20/2012       Chief Complaint for this Admission:  double vision    Allergies:  Patient has no known allergies. Prior to Admission Medications:   Prior to Admission Medications   Prescriptions Last Dose Informant Patient Reported? Taking?   ascorbic acid, vitamin C, (VITAMIN C) 500 mg tablet   Yes Yes   Sig: Take 500 mg by mouth daily. aspirin (ASPIRIN) 325 mg tablet 12/26/2018 at am  Yes Yes   Sig: Take 325 mg by mouth daily. atorvastatin (LIPITOR) 20 mg tablet 12/26/2018 at am  No Yes   Sig: Take 1 Tab by mouth daily. diclofenac EC (VOLTAREN) 75 mg EC tablet   No Yes   Sig: Take 1 Tab by mouth two (2) times daily as needed. levothyroxine (SYNTHROID) 112 mcg tablet 12/26/2018 at am  No Yes   Sig: Take 1 Tab by mouth Daily (before breakfast). magnesium oxide (MAG-OX) 400 mg tablet   Yes Yes   Sig: Take 400 mg by mouth daily. multivitamin (ONE A DAY) tablet   Yes Yes   Sig: Take 1 Tab by mouth daily. omega-3 fatty acids-vitamin e (FISH OIL) 1,000 mg Cap   Yes Yes   Sig: Take 1 Cap by mouth daily. pantoprazole (PROTONIX) 40 mg tablet   No No   Sig: Take 1 Tab by mouth daily. tamsulosin (FLOMAX) 0.4 mg capsule  at am  No Yes   Sig: Take 1 Cap by mouth daily. traMADol (ULTRAM) 50 mg tablet   No Yes   Sig: Take 1 Tab by mouth every six (6) hours as needed for Pain.   vitamin E (AQUA GEMS) 400 unit capsule   Yes Yes   Sig: Take 400 Units by mouth daily. Facility-Administered Medications: None         Comments/Recommendations: Reviewed medication w/ pt and Rx Query.     (1) Add:   - magnesium oxide  - vitamin C  - vitamin E    (2) Change: n/a    (3) Remove: guaifenesin-codeine    Pt states that he did not take any medications today. All last doses were yesterday. Pt takes diclofenac and tramadol prn. Reviewed medication allergies with pt, ZABRINA.      Dixie Barnard PharmD candidate

## 2018-12-28 NOTE — PROGRESS NOTES
TRANSFER - IN REPORT:    Verbal report received from Misael RN(name) on Petra Brand  being received from ED(unit) for routine progression of care      Report consisted of patients Situation, Background, Assessment and   Recommendations(SBAR). Information from the following report(s) SBAR, Kardex and Recent Results was reviewed with the receiving nurse. Opportunity for questions and clarification was provided. Assessment completed upon patients arrival to unit and care assumed.

## 2018-12-28 NOTE — PROGRESS NOTES
Occupational Therapy EVALUATION/discharge Patient: Kailey Haywood (77 y.o. male) Date: 12/28/2018 Primary Diagnosis: Double vision Precautions:  Fall ASSESSMENT:  
Based on the objective data described below, the patient presents with diplopia, corrected with eye patch. Patient provided with handout on eye patch wearing schedule (30 min R, 30 min L, 30 min off). Visual tracking, acuity (corrected with glasses), and detection of stimuli intact. BUE AROM/ strength/ coordination equal and WFL, sensation also intact. Performed bed mobility, sit-stand, and ambulated independently with no AD. Demonstrates functional access to entire body, infer patient independent with ADLs. Patient receptive to [facility] education on signs/ symptoms of CVA should any other deficits arise. Patient does not require additional acute care OT at this time. Recommend d/c home when medically stable and follow up with outpatient vision therapy to address continued reports of double vision. Further skilled acute occupational therapy is not indicated at this time. Discharge Recommendations: home, outpatient vision therapy Further Equipment Recommendations for Discharge: none SUBJECTIVE:  
Patient stated I still have double vision with the eye patch off.  OBJECTIVE DATA SUMMARY:  
HISTORY:  
Past Medical History:  
Diagnosis Date  Arthritis 8/20/2012  GERD (gastroesophageal reflux disease) 8/20/2012  
 HTN (hypertension) 8/20/2012  Hyperlipemia 8/20/2012  IH (inguinal hernia) 2/22/2015 2015, small right , reducible IH .  Thyroid disorder 8/20/2012 Past Surgical History:  
Procedure Laterality Date  HX CORONARY ARTERY BYPASS GRAFT Prior Level of Function/Environment/Context: indep with ADLs/ IADLs, retired Expanded or extensive additional review of patient history:  
 
Home Situation Home Environment: Private residence # Steps to Enter: 4 Rails to Enter:  Yes 
 Hand Rails : Right One/Two Story Residence: Two story # of Interior Steps: 15 Height of Each Step (in): 2 inches Interior Rails: Both Lift Chair Available: No 
Living Alone: No 
Support Systems: Friends \ neighbors, Spouse/Significant Other/Partner Patient Expects to be Discharged to[de-identified] Private residence Current DME Used/Available at Home: None EXAMINATION OF PERFORMANCE DEFICITS: 
Cognitive/Behavioral Status: 
Neurologic State: Alert Orientation Level: Oriented X4 Cognition: Appropriate safety awareness; Appropriate for age attention/concentration; Appropriate decision making; Follows commands Perception: Appears intact Perseveration: No perseveration noted Safety/Judgement: Awareness of environment; Insight into deficits Skin: visible skin appears intact Edema: none noted Hearing: Auditory Auditory Impairment: None Vision/Perceptual:   
Tracking: Able to track stimulus in all quadrants w/o difficulty(eyes assessed separately, with eye patch) Visual Fields: (able to detect stimuli in all fields) Diplopia: Yes(resolving with eye patch) Acuity: Within Defined Limits;Able to read clock/calendar on wall without difficulty; Able to read employee name badge without difficulty(eyes assessed separately, with eye patch) Corrective Lenses: Glasses(eye patch) Range of Motion: 
 
AROM: Within functional limits PROM: Within functional limits Strength: 
 
Strength: Within functional limits Coordination: 
Coordination: Within functional limits Fine Motor Skills-Upper: Left Intact; Right Intact Gross Motor Skills-Upper: Left Intact; Right Intact Tone & Sensation: 
 
Tone: Normal 
Sensation: Intact Balance: 
Sitting: Intact Standing: Impaired Standing - Static: Good Standing - Dynamic : Fair Functional Mobility and Transfers for ADLs:Bed Mobility: 
Supine to Sit: Independent Sit to Supine: Independent Transfers: Sit to Stand: Independent Stand to Sit: Independent Toilet Transfer : Independent(inferred) ADL Assessment: 
Feeding: Independent(inferred) Oral Facial Hygiene/Grooming: Independent(inferred) Bathing: Independent(inferred) Upper Body Dressing: Independent(inferred) Lower Body Dressing: Independent(inferred) Toileting: Independent(inferred) ADL Intervention and task modifications: 
   
  
 
Cognitive Retraining Safety/Judgement: Awareness of environment; Insight into deficits Functional Measure: 
Barthel Index: 
 
Bathin Bladder: 10 Bowels: 10 
Groomin Dressing: 10 Feeding: 10 Mobility: 15 
Stairs: 10 Toilet Use: 10 Transfer (Bed to Chair and Back): 15 Total: 100 Barthel and G-code impairment scale: 
Percentage of impairment CH 
0% CI 
1-19% CJ 
20-39% CK 
40-59% CL 
60-79% CM 
80-99% CN 
100% Barthel Score 0-100 100 99-80 79-60 59-40 20-39 1-19 
 0 Barthel Score 0-20 20 17-19 13-16 9-12 5-8 1-4 0 The Barthel ADL Index: Guidelines 1. The index should be used as a record of what a patient does, not as a record of what a patient could do. 2. The main aim is to establish degree of independence from any help, physical or verbal, however minor and for whatever reason. 3. The need for supervision renders the patient not independent. 4. A patient's performance should be established using the best available evidence. Asking the patient, friends/relatives and nurses are the usual sources, but direct observation and common sense are also important. However direct testing is not needed. 5. Usually the patient's performance over the preceding 24-48 hours is important, but occasionally longer periods will be relevant. 6. Middle categories imply that the patient supplies over 50 per cent of the effort. 7. Use of aids to be independent is allowed. Terese Lima., Barthel, D.W. (3671). Functional evaluation: the Barthel Index. [address] [facility] (14)2. FABIOLA Tam, Senthil Tuttle., Guilherme MarcosOr[facility], [address] (1999). Measuring the change indisability after inpatient rehabilitation; comparison of the responsiveness of the Barthel Index and Functional Piatt Measure. Journal of Neurology, Neurosurgery, and Psychiatry, 66(4), 538-97. YADIRA Capps, MIKE Elise, & John Grigsby M.A. (2004.) Assessment of post-stroke quality of life in cost-effectiveness studies: The usefulness of the Barthel Index and the EuroQoL-5D. [facility], 13, 792-10 G codes: In compliance with CMSs Claims Based Outcome Reporting, the following G-code set was chosen for this patient based on their primary functional limitation being treated: The outcome measure chosen to determine the severity of the functional limitation was the Barthel Index with a score of 100/100 which was correlated with the impairment scale. ? Self Care:  
  - CURRENT STATUS: CH - 0% impaired, limited or restricted  - GOAL STATUS: CH - 0% impaired, limited or restricted  - D/C STATUS:  CH - 0% impaired, limited or restricted Occupational Therapy Evaluation Charge Determination History Examination Decision-Making LOW Complexity : Brief history review  LOW Complexity : 1-3 performance deficits relating to physical, cognitive , or psychosocial skils that result in activity limitations and / or participation restrictions  LOW Complexity : No comorbidities that affect functional and no verbal or physical assistance needed to complete eval tasks Based on the above components, the patient evaluation is determined to be of the following complexity level: LOW Pain: 
Pain Scale 1: Numeric (0 - 10) Pain Intensity 1: 0 Activity Tolerance: VSS After treatment:  
[]  Patient left in no apparent distress sitting up in chair 
[x]  Patient left in no apparent distress in bed 
[x]  Call bell left within reach [x]  Nursing notified 
[]  Caregiver present 
[]  Bed alarm activated COMMUNICATION/EDUCATION:  
Communication/Collaboration: 
[x]      Home safety education was provided and the patient/caregiver indicated understanding. [x]      Patient/family have participated as able and agree with findings and recommendations. []      Patient is unable to participate in plan of care at this time. Findings and recommendations were discussed with: Physical Therapist and Registered Nurse Shital Betts OT Time Calculation: 28 mins

## 2018-12-29 VITALS
HEIGHT: 66 IN | BODY MASS INDEX: 29.55 KG/M2 | DIASTOLIC BLOOD PRESSURE: 70 MMHG | WEIGHT: 183.86 LBS | SYSTOLIC BLOOD PRESSURE: 138 MMHG | TEMPERATURE: 98.2 F | HEART RATE: 79 BPM | OXYGEN SATURATION: 99 % | RESPIRATION RATE: 16 BRPM

## 2018-12-29 PROCEDURE — 99218 HC RM OBSERVATION: CPT

## 2018-12-29 PROCEDURE — 74011250637 HC RX REV CODE- 250/637: Performed by: INTERNAL MEDICINE

## 2018-12-29 PROCEDURE — 74011250637 HC RX REV CODE- 250/637: Performed by: PSYCHIATRY & NEUROLOGY

## 2018-12-29 RX ORDER — CLOPIDOGREL BISULFATE 75 MG/1
75 TABLET ORAL DAILY
Qty: 30 TAB | Refills: 0 | Status: SHIPPED | OUTPATIENT
Start: 2018-12-30 | End: 2019-01-03 | Stop reason: SDUPTHER

## 2018-12-29 RX ORDER — ACETAMINOPHEN 325 MG/1
650 TABLET ORAL
Qty: 30 TAB | Refills: 0 | Status: SHIPPED
Start: 2018-12-29

## 2018-12-29 RX ADMIN — PANTOPRAZOLE SODIUM 40 MG: 40 TABLET, DELAYED RELEASE ORAL at 08:39

## 2018-12-29 RX ADMIN — TAMSULOSIN HYDROCHLORIDE 0.4 MG: 0.4 CAPSULE ORAL at 08:39

## 2018-12-29 RX ADMIN — ATORVASTATIN CALCIUM 20 MG: 20 TABLET, FILM COATED ORAL at 08:39

## 2018-12-29 RX ADMIN — LEVOTHYROXINE SODIUM 112 MCG: 112 TABLET ORAL at 06:40

## 2018-12-29 RX ADMIN — CLOPIDOGREL BISULFATE 75 MG: 75 TABLET ORAL at 08:39

## 2018-12-29 NOTE — DISCHARGE INSTRUCTIONS
Patient Discharge Instructions    Tana Floyd / [id number] : 1945    Admitted 2018 Discharged: 2018     Take Home Medications            · It is important that you take the medication exactly as they are prescribed. · Keep your medication in the bottles provided by the pharmacist and keep a list of the medication names, dosages, and times to be taken in your wallet. · Do not take other medications without consulting your doctor. What to do at Home    Recommended diet: avoid sweets and Carbohydrates, such as bread, pasta, rice, and potatoes    Recommended activity: no driving a car for now. During the day, wear the eye patch for 30 minutes at a time on one eye and then the next. Over time, gradually increase the time the patch is worn over either eye  Warm compresses over the right eye as needed for itching. Walk carefully     If you experience any of the following symptoms : worsened vision; headache; change in speech, strength, speech, or sensation, please follow up with Dr. Charly Brewer at [phone] . Follow-up with Dr. Ava Oakley. On 18 in the morning, call Dr. Danyell Alexander at [phone] for an appointment later this week. Information obtained by :  I understand that if any problems occur once I am at home I am to contact my physician. I understand and acknowledge receipt of the instructions indicated above.                                                                                                                                            Physician's or R.N.'s Signature                                                                  Date/Time                                                                                                                                              Patient or Representative Signature                                                          Date/Time

## 2018-12-29 NOTE — PROGRESS NOTES
Medical Progress Note NAME: Anthony Whitten :  1945 MRM:  [id number] Date/Time: 2018 Problem List:  
Active Problems: 
  Double vision (2018) Subjective:  
 
Diplopia the same. Head MRI and CTA negative Minor rt eye itch Past Medical History:  
Diagnosis Date  Arthritis 2012  GERD (gastroesophageal reflux disease) 2012  
 HTN (hypertension) 2012  Hyperlipemia 2012  IH (inguinal hernia) 2015, small right , reducible IH .  Thyroid disorder 2012 Objective:  
 
 
 
Vitals:  
  
Last 24hrs VS reviewed since prior progress note. Most recent are: 
 
Visit Vitals /67 (BP 1 Location: Right arm, BP Patient Position: At rest) Pulse 77 Temp 98.6 °F (37 °C) Resp 15 Ht 5' 6\" (1.676 m) Wt 183 lb 13.8 oz (83.4 kg) SpO2 97% BMI 29.68 kg/m² SpO2 Readings from Last 6 Encounters:  
18 97% 07/09/15 98% 06/09/15 98% No intake or output data in the 24 hours ending 18 9121 Exam:  
   General:  Alert, cooperative, no distress, appears stated age. Eyes: mild decr Rt Eye EOM PERRLA  . Conjunctivae clear Lungs:   Clear to auscultation bilaterally. Heart:  Regular rate and rhythm, S1, S2 normal  
Abdomen:   Soft, non-tender. Bowel sounds normal.  
Extremities: No edema Neuro:  
 
Diplopia : looking straight ahead And up to right and left O/w non focal  
 
  
 
Lab Data Reviewed: (see below) No results found for this or any previous visit (from the past 24 hour(s)). Medications Reviewed: (see below) 
 
______________________________________________________________________ Medications:  
 
Current Facility-Administered Medications Medication Dose Route Frequency  clopidogrel (PLAVIX) tablet 75 mg  75 mg Oral DAILY  atorvastatin (LIPITOR) tablet 20 mg  20 mg Oral DAILY  levothyroxine (SYNTHROID) tablet 112 mcg  112 mcg Oral ACB  pantoprazole (PROTONIX) tablet 40 mg  40 mg Oral DAILY  tamsulosin (FLOMAX) capsule 0.4 mg  0.4 mg Oral DAILY  sodium chloride (NS) flush 5-10 mL  5-10 mL IntraVENous Q8H  
 sodium chloride (NS) flush 5-10 mL  5-10 mL IntraVENous PRN  
 acetaminophen (TYLENOL) tablet 650 mg  650 mg Oral Q4H PRN Or  
 acetaminophen (TYLENOL) solution 650 mg  650 mg Per NG tube Q4H PRN Or  
 acetaminophen (TYLENOL) suppository 650 mg  650 mg Rectal Q4H PRN  
 ondansetron (ZOFRAN) injection 4 mg  4 mg IntraVENous Q6H PRN  
 magnesium hydroxide (MILK OF MAGNESIA) 400 mg/5 mL oral suspension 30 mL  30 mL Oral DAILY PRN  
 enoxaparin (LOVENOX) injection 40 mg  40 mg SubCUTAneous Q24H Assessment:  
Right CN 3 palsy. Appears stable. He is anxious for d/c. D/c instructions reviewed Patient Active Problem List  
Diagnosis Code  GERD (gastroesophageal reflux disease) K21.9  Hyperlipemia E78.5  
 HTN (hypertension) I10  
 Arthritis M19.90  
 IH (inguinal hernia) K40.90  CAD (coronary artery disease) I25.10  Double vision H53.2 Plan:  
 
F/u in the office or sooner prn  
      
 
  
 
 
  
              
 
 
 
 
 
 
      
___________________________________________________ Attending Physician: Madeline Burks MD

## 2018-12-29 NOTE — PROGRESS NOTES
Problem: Falls - Risk of 
Goal: *Absence of Falls Document Orvel Buffy Fall Risk and appropriate interventions in the flowsheet. Outcome: Progressing Towards Goal 
Fall Risk Interventions: 
  
 
  
 
Medication Interventions: Patient to call before getting OOB, Evaluate medications/consider consulting pharmacy Elimination Interventions: Call light in reach, Patient to call for help with toileting needs, Toileting schedule/hourly rounds Problem: Pressure Injury - Risk of 
Goal: *Prevention of pressure injury Document Angel Scale and appropriate interventions in the flowsheet. Outcome: Progressing Towards Goal 
Pressure Injury Interventions: 
Sensory Interventions: Assess changes in LOC, Assess need for specialty bed, Minimize linen layers Activity Interventions: Increase time out of bed, PT/OT evaluation, Pressure redistribution bed/mattress(bed type)

## 2018-12-29 NOTE — ROUTINE PROCESS
Bedside shift change report given to Niecy Meadows (oncoming nurse) by Germán (offgoing nurse). Report included the following information SBAR, Kardex, Procedure Summary, Accordion, Recent Results and Cardiac Rhythm NSR.

## 2018-12-29 NOTE — PROGRESS NOTES
Stroke Education documented in Patient Education: YES Core Measures Documented in [facility]: 
Risk Factors: YES Warning signs of stroke: YES When to Activate 911: YES Medication Education for Risk Factors: YES Smoking cessation if applicable: NO 
Written Education Given:  YES Discharge NIH Completed: YES Score: 0 BRAINS: YES Follow Up Appointment Made: NO 
Date/Time if applicable:

## 2019-10-07 ENCOUNTER — HOSPITAL ENCOUNTER (OUTPATIENT)
Age: 74
Setting detail: OUTPATIENT SURGERY
Discharge: HOME | End: 2019-10-07
Attending: INTERNAL MEDICINE | Admitting: INTERNAL MEDICINE
Payer: MEDICARE

## 2019-10-07 ENCOUNTER — ANESTHESIA EVENT (OUTPATIENT)
Dept: ENDOSCOPY | Age: 74
End: 2019-10-07
Payer: MEDICARE

## 2019-10-07 ENCOUNTER — ANESTHESIA (OUTPATIENT)
Dept: ENDOSCOPY | Age: 74
End: 2019-10-07
Payer: MEDICARE

## 2019-10-07 VITALS
BODY MASS INDEX: 30.22 KG/M2 | WEIGHT: 188 LBS | SYSTOLIC BLOOD PRESSURE: 144 MMHG | OXYGEN SATURATION: 95 % | DIASTOLIC BLOOD PRESSURE: 75 MMHG | TEMPERATURE: 98 F | RESPIRATION RATE: 14 BRPM | HEIGHT: 66 IN | HEART RATE: 84 BPM

## 2019-10-07 PROCEDURE — 76060000032 HC ANESTHESIA 0.5 TO 1 HR: Performed by: INTERNAL MEDICINE

## 2019-10-07 PROCEDURE — 77030021593 HC FCPS BIOP ENDOSC BSC -A: Performed by: INTERNAL MEDICINE

## 2019-10-07 PROCEDURE — 88305 TISSUE EXAM BY PATHOLOGIST: CPT

## 2019-10-07 PROCEDURE — 74011000250 HC RX REV CODE- 250: Performed by: NURSE ANESTHETIST, CERTIFIED REGISTERED

## 2019-10-07 PROCEDURE — 74011250636 HC RX REV CODE- 250/636: Performed by: NURSE ANESTHETIST, CERTIFIED REGISTERED

## 2019-10-07 PROCEDURE — 76040000007: Performed by: INTERNAL MEDICINE

## 2019-10-07 RX ORDER — NALOXONE HYDROCHLORIDE 0.4 MG/ML
0.4 INJECTION, SOLUTION INTRAMUSCULAR; INTRAVENOUS; SUBCUTANEOUS
Status: DISCONTINUED | OUTPATIENT
Start: 2019-10-07 | End: 2019-10-07 | Stop reason: HOSPADM

## 2019-10-07 RX ORDER — SODIUM CHLORIDE 0.9 % (FLUSH) 0.9 %
5-40 SYRINGE (ML) INJECTION EVERY 8 HOURS
Status: DISCONTINUED | OUTPATIENT
Start: 2019-10-07 | End: 2019-10-07 | Stop reason: HOSPADM

## 2019-10-07 RX ORDER — SODIUM CHLORIDE 9 MG/ML
50 INJECTION, SOLUTION INTRAVENOUS CONTINUOUS
Status: DISCONTINUED | OUTPATIENT
Start: 2019-10-07 | End: 2019-10-07 | Stop reason: HOSPADM

## 2019-10-07 RX ORDER — SODIUM CHLORIDE 0.9 % (FLUSH) 0.9 %
5-40 SYRINGE (ML) INJECTION AS NEEDED
Status: DISCONTINUED | OUTPATIENT
Start: 2019-10-07 | End: 2019-10-07 | Stop reason: HOSPADM

## 2019-10-07 RX ORDER — MIDAZOLAM HYDROCHLORIDE 1 MG/ML
.25-5 INJECTION, SOLUTION INTRAMUSCULAR; INTRAVENOUS
Status: DISCONTINUED | OUTPATIENT
Start: 2019-10-07 | End: 2019-10-07 | Stop reason: HOSPADM

## 2019-10-07 RX ORDER — DEXTROMETHORPHAN/PSEUDOEPHED 2.5-7.5/.8
1.2 DROPS ORAL
Status: DISCONTINUED | OUTPATIENT
Start: 2019-10-07 | End: 2019-10-07 | Stop reason: HOSPADM

## 2019-10-07 RX ORDER — ATROPINE SULFATE 0.1 MG/ML
0.5 INJECTION INTRAVENOUS
Status: DISCONTINUED | OUTPATIENT
Start: 2019-10-07 | End: 2019-10-07 | Stop reason: HOSPADM

## 2019-10-07 RX ORDER — EPINEPHRINE 0.1 MG/ML
1 INJECTION INTRACARDIAC; INTRAVENOUS
Status: DISCONTINUED | OUTPATIENT
Start: 2019-10-07 | End: 2019-10-07 | Stop reason: HOSPADM

## 2019-10-07 RX ORDER — FLUMAZENIL 0.1 MG/ML
0.2 INJECTION INTRAVENOUS
Status: DISCONTINUED | OUTPATIENT
Start: 2019-10-07 | End: 2019-10-07 | Stop reason: HOSPADM

## 2019-10-07 RX ORDER — LIDOCAINE HYDROCHLORIDE 20 MG/ML
INJECTION, SOLUTION EPIDURAL; INFILTRATION; INTRACAUDAL; PERINEURAL AS NEEDED
Status: DISCONTINUED | OUTPATIENT
Start: 2019-10-07 | End: 2019-10-07 | Stop reason: HOSPADM

## 2019-10-07 RX ORDER — SODIUM CHLORIDE 9 MG/ML
INJECTION, SOLUTION INTRAVENOUS
Status: DISCONTINUED | OUTPATIENT
Start: 2019-10-07 | End: 2019-10-07 | Stop reason: HOSPADM

## 2019-10-07 RX ORDER — PROPOFOL 10 MG/ML
INJECTION, EMULSION INTRAVENOUS AS NEEDED
Status: DISCONTINUED | OUTPATIENT
Start: 2019-10-07 | End: 2019-10-07 | Stop reason: HOSPADM

## 2019-10-07 RX ORDER — FENTANYL CITRATE 50 UG/ML
25-200 INJECTION, SOLUTION INTRAMUSCULAR; INTRAVENOUS
Status: DISCONTINUED | OUTPATIENT
Start: 2019-10-07 | End: 2019-10-07 | Stop reason: HOSPADM

## 2019-10-07 RX ADMIN — PROPOFOL 50 MG: 10 INJECTION, EMULSION INTRAVENOUS at 15:48

## 2019-10-07 RX ADMIN — SODIUM CHLORIDE: 900 INJECTION, SOLUTION INTRAVENOUS at 15:35

## 2019-10-07 RX ADMIN — PROPOFOL 50 MG: 10 INJECTION, EMULSION INTRAVENOUS at 15:38

## 2019-10-07 RX ADMIN — PROPOFOL 50 MG: 10 INJECTION, EMULSION INTRAVENOUS at 15:46

## 2019-10-07 RX ADMIN — PROPOFOL 50 MG: 10 INJECTION, EMULSION INTRAVENOUS at 15:58

## 2019-10-07 RX ADMIN — PROPOFOL 50 MG: 10 INJECTION, EMULSION INTRAVENOUS at 15:43

## 2019-10-07 RX ADMIN — PROPOFOL 50 MG: 10 INJECTION, EMULSION INTRAVENOUS at 15:39

## 2019-10-07 RX ADMIN — PROPOFOL 50 MG: 10 INJECTION, EMULSION INTRAVENOUS at 15:51

## 2019-10-07 RX ADMIN — PROPOFOL 50 MG: 10 INJECTION, EMULSION INTRAVENOUS at 15:55

## 2019-10-07 RX ADMIN — LIDOCAINE HYDROCHLORIDE 80 MG: 20 INJECTION, SOLUTION EPIDURAL; INFILTRATION; INTRACAUDAL; PERINEURAL at 15:38

## 2019-10-07 NOTE — ANESTHESIA POSTPROCEDURE EVALUATION
Procedure(s):  ESOPHAGOGASTRODUODENOSCOPY (EGD) AND COLONOSCOPY  COLONOSCOPY  ESOPHAGOGASTRODUODENAL (EGD) BIOPSY  COLON BIOPSY. MAC    Anesthesia Post Evaluation      Multimodal analgesia: multimodal analgesia used between 6 hours prior to anesthesia start to PACU discharge  Patient location during evaluation: bedside  Patient participation: waiting for patient participation  Level of consciousness: awake  Pain management: adequate  Airway patency: patent  Anesthetic complications: no  Cardiovascular status: acceptable  Respiratory status: unassisted  Hydration status: acceptable  Comments: Post-Anesthesia Evaluation and Assessment    I have evaluated the patient and they are ready for PACU discharge. Patient: Gabriella Blake MRN: [id number]  SSN: [id number]   YOB: 1945  Age: 69 y.o. Sex: male      Cardiovascular Function/Vital Signs  /62   Pulse 94   Temp 36.7 °C (98 °F)   Resp 21   Ht 5' 6\" (1.676 m)   Wt 85.3 kg (188 lb)   SpO2 96%   BMI 30.34 kg/m²     Patient is status post MAC anesthesia for Procedure(s):  ESOPHAGOGASTRODUODENOSCOPY (EGD) AND COLONOSCOPY  COLONOSCOPY  ESOPHAGOGASTRODUODENAL (EGD) BIOPSY  COLON BIOPSY. Nausea/Vomiting: None    Postoperative hydration reviewed and adequate. Pain:  Pain Scale 1: Visual (10/07/19 1614)  Pain Intensity 1: 0 (10/07/19 1614)   Managed    Neurological Status: At baseline    Mental Status, Level of Consciousness: Alert and  oriented to person, place, and time    Pulmonary Status:   O2 Device: Nasal cannula (10/07/19 1606)   Adequate oxygenation and airway patent    Complications related to anesthesia: None    Post-anesthesia assessment completed.  No concerns    Signed By: Evangelist Regalado MD    October 7, 2019                   Vitals Value Taken Time   /75 10/7/2019  4:32 PM   Temp 36.7 °C (98 °F) 10/7/2019  4:14 PM   Pulse 91 10/7/2019  4:34 PM   Resp 15 10/7/2019  4:34 PM   SpO2 94 % 10/7/2019  4:34 PM Vitals shown include unvalidated device data.

## 2019-10-07 NOTE — PROGRESS NOTES
Petra Brand  1945  [id number]    Situation:  Verbal report received from:Letty  Procedure: Procedure(s):  ESOPHAGOGASTRODUODENOSCOPY (EGD) AND COLONOSCOPY  COLONOSCOPY  ESOPHAGOGASTRODUODENAL (EGD) BIOPSY  COLON BIOPSY    Background:    Preoperative diagnosis: SCREENING, FAMILY HX OF GASTRIC CANCER  Postoperative diagnosis: Hiatal Hernia  Gastritis  Duodenitis  Appendiceal Orifice Polyp  Diverticulosis  Internal Hemorrhoids    :  Mamadou Johnson  Assistant(s): Endoscopy Technician-1: Kemar Fink  Endoscopy RN-1: Claudia French RN  Endoscopy RN-2: Patel Umanzor RN    Specimens:   ID Type Source Tests Collected by Time Destination   1 : Duodenum Preservative   Ninfa Restrepo MD 10/7/2019 1542 Pathology   2 : Gastric Preservative   Ninfa Restrepo MD 10/7/2019 1542 Pathology   3 : Appendiceal Orifice Polyp Bx Preservative   Ninfa Restrepo MD 10/7/2019 1552 Pathology     H. Pylori  no    Assessment:  Intra-procedure medications   VAnesthesia gave intra-procedure sedation and medications, see anesthesia flow sheet no    Intravenous fluids: NS@ KVO     Vital signs stable     Abdominal assessment: round and soft     Recommendation:  Discharge patient per MD order.     Family or Friend   Permission to share finding with family or friend yes

## 2019-10-07 NOTE — PROCEDURES
[address], [address] [place] [address]        Colonoscopy Operative Report    Samara Mak  [id number]  1945      Procedure Type:   Colonoscopy with biopsy     Indications:    Personal history of colon polyps (screening only)         Pre-operative Diagnosis: see indication above    Post-operative Diagnosis:  See findings below    :  Alejandro Ashton. Chastity Zamora MD      Referring Provider: Catherine King MD      Sedation:  MAC anesthesia      Procedure Details:  After informed consent was obtained with all risks and benefits of procedure explained and preoperative exam completed, the patient was taken to the endoscopy suite and placed in the left lateral decubitus position. Upon sequential sedation as per above, a digital rectal exam was performed demonstrating internal hemorrhoids. The Olympus pediatric videocolonoscope  was inserted in the rectum and carefully advanced to the cecum, which was identified by the ileocecal valve and appendiceal orifice. The cecum was identified by the ileocecal valve and appendiceal orifice. The quality of preparation was good. The colonoscope was slowly withdrawn with careful evaluation between folds. Retroflexion in the rectum was completed . Findings:   Rectum: normal  Sigmoid: mild diverticulosis  Descending Colon: normal  Transverse Colon: normal  Ascending Colon: normal  Cecum: a nodular, sessile polyp with irregular borders was seen arising from the appendiceal orifice. This was biopsied and was not removed. Terminal Ileum: not intubated      Specimen Removed:  1. Appendiceal orifice polyp biopsy    Complications: None. EBL:  None. Impression:    1. Appendiceal orifice polyp - biopsied  2. Mild sigmoid diverticulosis  3. Small internal hemorrhoids    Recommendations:  1. Follow up surgical pathology  2. Referral to surgery to discuss option of ileo-cecectomy resection  3. High fiber diet education  4.  Restart Plavix on Thursday of this week    Signed By: Benji Nolasco.  Jason Grimm MD     10/7/2019  4:19 PM

## 2019-10-07 NOTE — DISCHARGE INSTRUCTIONS
[address]  174 [facility], [address] [place] [address]    EGD/COLON DISCHARGE INSTRUCTIONS    Lucille Zayas  [id number]  1945    Discomfort:  Sore throat- throat lozenges or warm salt water gargle  redness at IV site- apply warm compress to area; if redness or soreness persist- contact your physician  Gaseous discomfort- walking, belching will help relieve any discomfort  You may not operate a vehicle for 12 hours  You may not engage in an occupation involving machinery or appliances for rest of today  You may not drink alcoholic beverages for at least 12 hours  Avoid making any critical decisions for at least 24 hour  DIET  You may resume your regular diet - however -  remember your colon is empty and a heavy meal will produce gas. Avoid these foods:  vegetables, fried / greasy foods, carbonated drinks    ACTIVITY  You may resume your normal daily activities   Spend the remainder of the day resting -  avoid any strenuous activity. CALL M.D. ANY SIGN OF   Increasing pain, nausea, vomiting  Abdominal distension (swelling)  New increased bleeding (oral or rectal)  Fever (chills)  Pain in chest area  Bloody discharge from nose or mouth  Shortness of breath    Follow-up Instructions:   Call Dr. Luis Love for any questions or problems. Telephone # [phone]    ENDOSCOPY FINDINGS:   Your endoscopy showed acid-induced injury in the stomach and small intestine. Biopsies were taken. We will contact you about the results. Your colonoscopy showed one polyp present where the appendix inserts into the colon (appendiceal orifice). This was biopsied and was not removed today. We will contact you about the pathology results and the next step in the plan (likely referral to a surgeon). You may resume your Plavix on Thursday. Signed By: Kushal Coello.  Bruna Carmona MD     10/7/2019  4:13 PM     Patient Education   Patient Education   Patient Education        Colon Polyps: Care Instructions  Your Care Instructions    Colon polyps are growths in the colon or the rectum. The cause of most colon polyps is not known, and most people who get them do not have any problems. But a certain kind can turn into cancer. For this reason, regular testing for colon polyps is important for people as they get older. It is also important for anyone who has an increased risk for colon cancer. Polyps are usually found through routine colon cancer screening tests. Although most colon polyps are not cancerous, they are usually removed and then tested for cancer. Screening for colon cancer saves lives because the cancer can usually be cured if it is caught early. If you have a polyp that is the type that can turn into cancer, you may need more tests to examine your entire colon. The doctor will remove any other polyps that he or she finds, and you will be tested more often. Follow-up care is a key part of your treatment and safety. Be sure to make and go to all appointments, and call your doctor if you are having problems. It's also a good idea to know your test results and keep a list of the medicines you take. How can you care for yourself at home? Regular exams to look for colon polyps are the best way to prevent polyps from turning into colon cancer. These can include stool tests, sigmoidoscopy, colonoscopy, and CT colonography. Talk with your doctor about a testing schedule that is right for you. To prevent polyps  There is no home treatment that can prevent colon polyps. But these steps may help lower your risk for cancer. · Stay active. Being active can help you get to and stay at a healthy weight. Try to exercise on most days of the week. Walking is a good choice. · Eat well. Choose a variety of vegetables, fruits, legumes (such as peas and beans), fish, poultry, and whole grains. · Do not smoke. If you need help quitting, talk to your doctor about stop-smoking programs and medicines.  These can increase your chances of quitting for good. · If you drink alcohol, limit how much you drink. Limit alcohol to 2 drinks a day for men and 1 drink a day for women. When should you call for help? Call your doctor now or seek immediate medical care if:    · You have severe belly pain.     · Your stools are maroon or very bloody.    Watch closely for changes in your health, and be sure to contact your doctor if:    · You have a fever.     · You have nausea or vomiting.     · You have a change in bowel habits (new constipation or diarrhea).     · Your symptoms get worse or are not improving as expected. Where can you learn more? Go to http://mp-thad.info/. Enter 95 [id number] in the search box to learn more about \"Colon Polyps: Care Instructions. \"  Current as of: December 19, 2018  Content Version: 12.2  © 0243-6862 [employer]. Care instructions adapted under license by [employer] (which disclaims liability or warranty for this information). If you have questions about a medical condition or this instruction, always ask your healthcare professional. [address] any warranty or liability for your use of this information. Duodenitis: Care Instructions  Your Care Instructions    The duodenum (say \"doo-AW-duh-num\") is the first part of the small intestine. It connects to the stomach. It's about 10 inches long and curved, almost forming a [place]. Duodenitis (say \"doo-aw-duh-[place]-tus\") may feel like a sore and upset stomach. It happens when something irritates the lining of the duodenum. Many things can cause it. These include an infection such as the flu or something you ate or drank. Certain medicines or having a sore (ulcer) on the lining of the duodenum also can cause it. Your belly may bloat and ache. You may belch, vomit, and feel sick to your stomach. You should be able to relieve the problem by taking medicine. And it may help to change your diet.  If the problem lasts, your doctor may prescribe different medicine. Follow-up care is a key part of your treatment and safety. Be sure to make and go to all appointments, and call your doctor if you are having problems. It's also a good idea to know your test results and keep a list of the medicines you take. How can you care for yourself at home? · If your doctor prescribed antibiotics, take them as directed. Do not stop taking them just because you feel better. You need to take the full course of antibiotics. · Be safe with medicines. If your doctor prescribed medicine to decrease stomach acid, take it as directed. Call your doctor if you think you are having a problem with your medicine. · Do not take any other medicine, including over-the-counter pain relievers, without talking to your doctor first.  · If your doctor recommends over-the-counter medicine to reduce stomach acid, such as Pepcid AC, Prilosec, Tagamet HB, or Zantac 75, follow the directions on the label. · To prevent dehydration, drink plenty of fluids. Choose water and other caffeine-free clear liquids until you feel better. If you have kidney, heart, or liver disease and have to limit fluids, talk with your doctor before you increase the amount of fluids you drink. · Limit how much alcohol you drink. · Avoid coffee, tea, cola drinks, chocolate, and other foods with caffeine. They increase stomach acid. When should you call for help? Call 911 anytime you think you may need emergency care. For example, call if:    · Your stools are maroon or very bloody.     · You vomit blood or what looks like coffee grounds.    Call your doctor now or seek immediate medical care if:    · You start breathing fast and have not produced urine in the last 8 hours.     · You are sick to your stomach or cannot drink fluids.    Watch closely for changes in your health, and be sure to contact your doctor if:    · You do not get better as expected.    Where can you learn more? Go to http://mp-thad.info/. Enter F079 in the search box to learn more about \"Duodenitis: Care Instructions. \"  Current as of: November 7, 2018  Content Version: 12.2  © 2923-8602 [employer]. Care instructions adapted under license by [employer] (which disclaims liability or warranty for this information). If you have questions about a medical condition or this instruction, always ask your healthcare professional. [address] any warranty or liability for your use of this information. Gastritis: Care Instructions  Your Care Instructions    Gastritis is a sore and upset stomach. It happens when something irritates the stomach lining. Many things can cause it. These include an infection such as the flu or something you ate or drank. Medicines or a sore on the lining of the stomach (ulcer) also can cause it. Your belly may bloat and ache. You may belch, vomit, and feel sick to your stomach. You should be able to relieve the problem by taking medicine. And it may help to change your diet. If gastritis lasts, your doctor may prescribe medicine. Follow-up care is a key part of your treatment and safety. Be sure to make and go to all appointments, and call your doctor if you are having problems. It's also a good idea to know your test results and keep a list of the medicines you take. How can you care for yourself at home? · If your doctor prescribed antibiotics, take them as directed. Do not stop taking them just because you feel better. You need to take the full course of antibiotics. · Be safe with medicines. If your doctor prescribed medicine to decrease stomach acid, take it as directed. Call your doctor if you think you are having a problem with your medicine.   · Do not take any other medicine, including over-the-counter pain relievers, without talking to your doctor first.  · If your doctor recommends over-the-counter medicine to reduce stomach acid, such as Pepcid AC, Prilosec, Tagamet HB, or Zantac 75, follow the directions on the label. · Drink plenty of fluids (enough so that your urine is light yellow or clear like water) to prevent dehydration. Choose water and other caffeine-free clear liquids. If you have kidney, heart, or liver disease and have to limit fluids, talk with your doctor before you increase the amount of fluids you drink. · Limit how much alcohol you drink. · Avoid coffee, tea, cola drinks, chocolate, and other foods with caffeine. They increase stomach acid. When should you call for help? Call 911 anytime you think you may need emergency care. For example, call if:    · You vomit blood or what looks like coffee grounds.     · You pass maroon or very bloody stools.    Call your doctor now or seek immediate medical care if:    · You start breathing fast and have not produced urine in the last 8 hours.     · You cannot keep fluids down.    Watch closely for changes in your health, and be sure to contact your doctor if:    · You do not get better as expected. Where can you learn more? Go to http://mp-thad.info/. Enter [id number] in the search box to learn more about \"Gastritis: Care Instructions. \"  Current as of: November 7, 2018  Content Version: 12.2  © 3322-9366 [employer]. Care instructions adapted under license by [employer] (which disclaims liability or warranty for this information). If you have questions about a medical condition or this instruction, always ask your healthcare professional. [place] [address] any warranty or liability for your use of this information.

## 2019-10-07 NOTE — ANESTHESIA PREPROCEDURE EVALUATION
Relevant Problems   No relevant active problems       Anesthetic History   No history of anesthetic complications            Review of Systems / Medical History  Patient summary reviewed, nursing notes reviewed and pertinent labs reviewed    Pulmonary  Within defined limits                 Neuro/Psych   Within defined limits           Cardiovascular  Within defined limits  Hypertension          CAD and CABG         GI/Hepatic/Renal  Within defined limits   GERD           Endo/Other  Within defined limits    Hypothyroidism  Arthritis     Other Findings              Physical Exam    Airway  Mallampati: II  TM Distance: > 6 cm  Neck ROM: normal range of motion   Mouth opening: Normal     Cardiovascular  Regular rate and rhythm,  S1 and S2 normal,  no murmur, click, rub, or gallop             Dental  No notable dental hx       Pulmonary  Breath sounds clear to auscultation               Abdominal  GI exam deferred       Other Findings            Anesthetic Plan    ASA: 2  Anesthesia type: MAC            Anesthetic plan and risks discussed with: Patient

## 2019-10-07 NOTE — H&P
[address]  174 [facility], [address] [place] [address]      History and Physical       NAME:  Verona Frazier   :   1945   MRN:   [id number]             History of Present Illness:  Patient is a 69 y.o. who is seen for GERD and colon cancer screening. Last colonoscopy was 20 years ago and polyps were removed. PMH:  Past Medical History:   Diagnosis Date    Arthritis 2012    GERD (gastroesophageal reflux disease) 2012    HTN (hypertension) 2012    Hyperlipemia 2012    IH (inguinal hernia) 2015, small right , reducible IH .  Ill-defined condition 2018    TIA, double vision    Thyroid disorder 2012       PSH:  Past Surgical History:   Procedure Laterality Date    HX CORONARY ARTERY BYPASS GRAFT         Allergies:  No Known Allergies    Home Medications:  Prior to Admission Medications   Prescriptions Last Dose Informant Patient Reported? Taking?   acetaminophen (TYLENOL) 325 mg tablet 2019 at Unknown time  No Yes   Sig: Take 2 Tabs by mouth every four (4) hours as needed for Pain or Fever (For temp greater than or equal to 38.5 C or 101.3 F (Unless hepatic failure or contrindicated). Give first line for fever. ). atorvastatin (LIPITOR) 20 mg tablet Not Taking at Unknown time  No No   Sig: Take 1 Tab by mouth daily. clopidogrel (PLAVIX) 75 mg tab 2019 at Unknown time  No Yes   Sig: Take 1 Tab by mouth daily. levothyroxine (SYNTHROID) 125 mcg tablet 10/7/2019 at Unknown time  No Yes   Sig: Take 1 Tab by mouth Daily (before breakfast). magnesium oxide (MAG-OX) 400 mg tablet 2019 at Unknown time  Yes Yes   Sig: Take 400 mg by mouth daily. multivitamin (ONE A DAY) tablet 2019 at Unknown time  Yes Yes   Sig: Take 1 Tab by mouth daily. omega-3 fatty acids-vitamin e (FISH OIL) 1,000 mg Cap 2019 at Unknown time  Yes Yes   Sig: Take 1 Cap by mouth daily.    pantoprazole (PROTONIX) 40 mg tablet 2019 at Unknown time  No Yes   Sig: Take 1 Tab by mouth daily. tamsulosin (FLOMAX) 0.4 mg capsule 9/30/2019 at Unknown time  No Yes   Sig: Take 1 Cap by mouth daily. traMADol (ULTRAM) 50 mg tablet 9/7/2019 at Unknown time  No Yes   Sig: Take 1 Tab by mouth every six (6) hours as needed for Pain.       Facility-Administered Medications: None       Hospital Medications:  Current Facility-Administered Medications   Medication Dose Route Frequency    0.9% sodium chloride infusion  50 mL/hr IntraVENous CONTINUOUS    sodium chloride (NS) flush 5-40 mL  5-40 mL IntraVENous Q8H    sodium chloride (NS) flush 5-40 mL  5-40 mL IntraVENous PRN    midazolam (VERSED) injection 0.25-5 mg  0.25-5 mg IntraVENous Multiple    fentaNYL citrate (PF) injection  mcg   mcg IntraVENous Multiple    naloxone (NARCAN) injection 0.4 mg  0.4 mg IntraVENous Multiple    flumazenil (ROMAZICON) 0.1 mg/mL injection 0.2 mg  0.2 mg IntraVENous Multiple    simethicone (MYLICON) 95NT/1.8KO oral drops 80 mg  1.2 mL Oral Multiple    atropine injection 0.5 mg  0.5 mg IntraVENous ONCE PRN    EPINEPHrine (ADRENALIN) 0.1 mg/mL syringe 1 mg  1 mg Endoscopically ONCE PRN       Social History:  Social History     Tobacco Use    Smoking status: Former Smoker     Types: Cigarettes    Smokeless tobacco: Never Used   Substance Use Topics    Alcohol use: Yes     Comment: Social drinker only        Family History:  Family History   Problem Relation Age of Onset    Cancer Father         stomach, liver    Cancer Maternal Uncle         prostate    Cancer Paternal Aunt     Cancer Paternal Uncle     Cancer Mother         lung ca    Heart Disease Neg Hx              Review of Systems:      Constitutional: negative fever, negative chills, negative weight loss  Eyes:   negative visual changes  ENT:   negative sore throat, tongue or lip swelling  Respiratory:  negative cough, negative dyspnea  Cards:  negative for chest pain, palpitations, lower extremity edema  GI:   See HPI  :  negative for frequency, dysuria  Integument:  negative for rash and pruritus  Heme:  negative for easy bruising and gum/nose bleeding  Musculoskel: negative for myalgias,  back pain and muscle weakness  Neuro: negative for headaches, dizziness, vertigo  Psych:  negative for feelings of anxiety, depression       Objective:     Patient Vitals for the past 8 hrs:   BP Temp Pulse Resp SpO2 Height Weight   10/07/19 1446 158/81 98.6 °F (37 °C) 92 18 96 % 5' 6\" (1.676 m) 85.3 kg (188 lb)     No intake/output data recorded. No intake/output data recorded. EXAM:     NEURO-a&o   HEENT-wnl   LUNGS-clear    COR-regular rate and rhythym     ABD-soft , no tenderness, no rebound, good bs     EXT-no edema     Data Review     No results for input(s): WBC, HGB, HCT, PLT, HGBEXT, HCTEXT, PLTEXT in the last 72 hours. No results for input(s): NA, K, CL, CO2, BUN, CREA, GLU, PHOS, CA in the last 72 hours. No results for input(s): SGOT, GPT, AP, TBIL, TP, ALB, GLOB, GGT, AML, LPSE in the last 72 hours. No lab exists for component: AMYP, HLPSE  No results for input(s): INR, PTP, APTT, INREXT in the last 72 hours. 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  Orquidea Whitt MD     10/7/2019  3:36 PM

## 2019-10-07 NOTE — PROCEDURES
[facility], [facility] 99 (EGD) Procedure Note    Walt Argueta  1945  [id number]      Procedure: Endoscopic Gastroduodenoscopy with biopsy    Indication: GERD     Pre-operative Diagnosis: see indication above    Post-operative Diagnosis: see findings below    : Sherman Villeda. López Whitten MD    Referring Provider:  Mukund Motta MD      Anesthesia/Sedation:  MAC anesthesia Propofol        Procedure Details     After informed consent was obtained for the procedure, with all risks and benefits of procedure explained the patient was taken to the endoscopy suite and placed in the left lateral decubitus position. Following sequential administration of sedation as per above, the endoscope was inserted into the mouth and advanced under direct vision to second portion of the duodenum. A careful inspection was made as the gastroscope was withdrawn, including a retroflexed view of the proximal stomach; findings and interventions are described below. Findings:   Esophagus:normal  Stomach: small hiatal hernia, patchy erythema in the antrum - cold biopsies taken  Duodenum: patchy erythema in the bulb - cold biopsies taken. Normal second portion. Therapies:  As above    Specimens: 1. Duodenum, 2. Gastric         EBL: None      Complications:   None; patient tolerated the procedure well. Impression:    As above    Recommendations:  1. Follow up surgical pathology  2. Treat for H pylori, if positive  3. Continue PPI  4. Avoid non-essential NSAID's, alcohol, and tobacco products  5. Proceed to colonoscopy    Signed By: Sherman Villeda.  López Whitten MD     10/7/2019  4:16 PM

## 2019-10-07 NOTE — PROGRESS NOTES

## 2019-12-05 ENCOUNTER — DOCUMENTATION ONLY (OUTPATIENT)
Dept: SURGERY | Age: 74
End: 2019-12-05

## 2019-12-05 ENCOUNTER — TELEPHONE (OUTPATIENT)
Dept: SURGERY | Age: 74
End: 2019-12-05

## 2019-12-05 ENCOUNTER — OFFICE VISIT (OUTPATIENT)
Dept: SURGERY | Age: 74
End: 2019-12-05

## 2019-12-05 VITALS
RESPIRATION RATE: 17 BRPM | HEART RATE: 86 BPM | SYSTOLIC BLOOD PRESSURE: 146 MMHG | BODY MASS INDEX: 29.6 KG/M2 | OXYGEN SATURATION: 94 % | TEMPERATURE: 98.9 F | WEIGHT: 184.2 LBS | HEIGHT: 66 IN | DIASTOLIC BLOOD PRESSURE: 82 MMHG

## 2019-12-05 DIAGNOSIS — I25.10 CORONARY ARTERY DISEASE INVOLVING NATIVE HEART WITHOUT ANGINA PECTORIS, UNSPECIFIED VESSEL OR LESION TYPE: ICD-10-CM

## 2019-12-05 DIAGNOSIS — D12.0 ADENOMATOUS POLYP OF CECUM: Primary | ICD-10-CM

## 2019-12-05 RX ORDER — METRONIDAZOLE 500 MG/1
500 TABLET ORAL 3 TIMES DAILY
Qty: 3 TAB | Refills: 0 | Status: SHIPPED | OUTPATIENT
Start: 2019-12-05 | End: 2019-12-06

## 2019-12-05 RX ORDER — NEOMYCIN SULFATE 500 MG/1
1000 TABLET ORAL 3 TIMES DAILY
Qty: 6 TAB | Refills: 0 | Status: SHIPPED | OUTPATIENT
Start: 2019-12-05 | End: 2019-12-06

## 2019-12-05 RX ORDER — METOCLOPRAMIDE 10 MG/1
10 TABLET ORAL 3 TIMES DAILY
Qty: 3 TAB | Refills: 0 | Status: SHIPPED | OUTPATIENT
Start: 2019-12-05 | End: 2019-12-06

## 2019-12-05 NOTE — PROGRESS NOTES
ERAS teaching completed. Patient was given medications and ERAS notebook. All questions answered. Patient to call the office with any further questions.

## 2019-12-05 NOTE — PROGRESS NOTES
1. Have you been to the ER, urgent care clinic since your last visit? Hospitalized since your last visit? No    2. Have you seen or consulted any other health care providers outside of the [address] [place] [address] since your last visit? Include any pap smears or colon screening. No    complains of  Cold. Took alkaseltzer cold. Denies n/v.  GABBI 12/5/19

## 2019-12-05 NOTE — LETTER
12/5/19 Patient: Mariia Betts YOB: 1945 Date of Visit: 12/5/2019 Qasim Cheek MD 
[address] [place] [id number] VIA In Basket Dear Qasim Cheek MD, Thank you for referring Mr. Petra Brand to [facility] for evaluation. My notes for this consultation are attached. If you have questions, please do not hesitate to call me. I look forward to following your patient along with you.  
 
 
Sincerely, 
 
Mc Nair MD

## 2019-12-06 ENCOUNTER — TELEPHONE (OUTPATIENT)
Dept: SURGERY | Age: 74
End: 2019-12-06

## 2019-12-06 NOTE — TELEPHONE ENCOUNTER
Returned call to patient. Two patient identifiers used. Patient called to let me know he received notices via [employer] regarding his appointments and wanted to let us know he appreciated the notices.

## 2019-12-06 NOTE — TELEPHONE ENCOUNTER
Patient stated he received a notice from [employer] about the [employer]. I told the patient I would call [employer] as the [facility] was sent to the [address] [facility] to [facility] with [facility] and was told she would stop the order, but if the patient received it he could just decline it. Spoke to patient and advised him of above. Patient expressed understanding and thanks for the assistance. Expected Date Of Service: 03/07/2019 Bill For Surgical Tray: no Performing Laboratory: [id number] Lab Facility: [id number] Billing Type: Third-Party Bill

## 2019-12-06 NOTE — PROGRESS NOTES
[address] [place] [address] [facility] History and Physical    Chief Complaint: Cecum/appendiceal orifice polyp    History of Present Illness:      Petra Brand is a 69 y.o. male who was kindly referred by Dr. Linette Valadez for a polyp of the cecum/appendiceal orifice that was not amenable to endoscopic removal.  This was the patient's first ever colonoscopy. The polyp was described as nodular, sessile with irregular borders that appeared to be arising from the appendiceal orifice. The polyp was biopsied and was a tubular adenoma. He has not been symptomatic and denies any blood in his stool, abdominal pain, changes in appetite, weight loss, diarrhea or constipation. He has been in his usual state of health. He has been referred for consideration of an ileocecectomy. He states he had a CABG in 2006 and last saw a cardiologist about 10 years ago. He is on plavix due to a possible 'mini stroke' last year. He held the plavix for his colonoscopy without difficulty. Past Medical History:   Diagnosis Date    Acid indigestion     Arthritis 8/20/2012    Back pain     GERD (gastroesophageal reflux disease) 8/20/2012    HTN (hypertension) 8/20/2012    Hyperlipemia 8/20/2012    IH (inguinal hernia) 2/22/2015 2015, small right , reducible IH .      Ill-defined condition 12/2018    TIA, double vision    Insomnia     Thyroid disorder 8/20/2012       Past Surgical History:   Procedure Laterality Date    COLONOSCOPY N/A 10/7/2019    COLONOSCOPY performed by Michelle Velazquez MD at [facility] ENDOSCOPY    HX CORONARY ARTERY BYPASS GRAFT  2006       Social History     Socioeconomic History    Marital status:      Spouse name: Not on file    Number of children: Not on file    Years of education: Not on file    Highest education level: Not on file   Occupational History    Not on file   Social Needs    Financial resource strain: Not on file    Food insecurity:     Worry: Not on file     Inability: Not on file    Transportation needs:     Medical: Not on file     Non-medical: Not on file   Tobacco Use    Smoking status: Former Smoker     Types: Cigarettes    Smokeless tobacco: Never Used   Substance and Sexual Activity    Alcohol use: Not Currently     Comment: Social drinker only     Drug use: Never    Sexual activity: Yes   Lifestyle    Physical activity:     Days per week: Not on file     Minutes per session: Not on file    Stress: Not on file   Relationships    Social connections:     Talks on phone: Not on file     Gets together: Not on file     Attends Yarsanism service: Not on file     Active member of club or organization: Not on file     Attends meetings of clubs or organizations: Not on file     Relationship status: Not on file    Intimate partner violence:     Fear of current or ex partner: Not on file     Emotionally abused: Not on file     Physically abused: Not on file     Forced sexual activity: Not on file   Other Topics Concern    Not on file   Social History Narrative    Not on file       Family History   Problem Relation Age of Onset    Cancer Father         stomach, liver    Cancer Maternal Uncle         prostate    Cancer Paternal Aunt     Cancer Paternal Uncle     Cancer Mother         lung ca    Diabetes Mother     Heart Disease Neg Hx          Current Outpatient Medications:     metoclopramide HCl (REGLAN) 10 mg tablet, Take 1 Tab by mouth three (3) times daily for 1 day. The day before surgery, take first dose at 6 am, 2nd dose at 12 pm and 3rd dose at 5 pm., Disp: 3 Tab, Rfl: 0    neomycin (MYCIFRADIN) 500 mg tablet, Take 2 Tabs by mouth three (3) times daily for 1 day. The day before surgery, take first dose at 8 am, 2nd dose at 9 am and 3rd dose at 5 pm.  Indications: Take 1000 mg on the day before surgery at 1 pm, 2 pm and 10 pm., Disp: 6 Tab, Rfl: 0    metroNIDAZOLE (FLAGYL) 500 mg tablet, Take 1 Tab by mouth three (3) times daily for 1 day.  The day before surgery, take first dose at 8 am, 2nd dose at 9 am and 3rd dose at 5 pm.  Indications: Take 500 mg on the day before surgery at 1 pm, 2pm and 10 pm., Disp: 3 Tab, Rfl: 0    PEG 3350-Electrolytes (COLYTE;GOLYTELY) solr, Take 4,000 mL by mouth once for 1 dose. On the day before surgery, begin drinking at 12 noon., Disp: 1 mL, Rfl: 0    levothyroxine (SYNTHROID) 125 mcg tablet, Take 1 Tab by mouth Daily (before breakfast). , Disp: 90 Tab, Rfl: 3    pantoprazole (PROTONIX) 40 mg tablet, Take 1 Tab by mouth daily. , Disp: 90 Tab, Rfl: 3    tamsulosin (FLOMAX) 0.4 mg capsule, Take 1 Cap by mouth daily. , Disp: 90 Cap, Rfl: 3    clopidogrel (PLAVIX) 75 mg tab, Take 1 Tab by mouth daily. , Disp: 90 Tab, Rfl: 3    acetaminophen (TYLENOL) 325 mg tablet, Take 2 Tabs by mouth every four (4) hours as needed for Pain or Fever (For temp greater than or equal to 38.5 C or 101.3 F (Unless hepatic failure or contrindicated). Give first line for fever. )., Disp: 30 Tab, Rfl: 0    magnesium oxide (MAG-OX) 400 mg tablet, Take 400 mg by mouth daily. , Disp: , Rfl:     atorvastatin (LIPITOR) 20 mg tablet, Take 1 Tab by mouth daily. , Disp: 90 Tab, Rfl: 3    traMADol (ULTRAM) 50 mg tablet, Take 1 Tab by mouth every six (6) hours as needed for Pain., Disp: 180 Tab, Rfl: 0    multivitamin (ONE A DAY) tablet, Take 1 Tab by mouth daily. , Disp: , Rfl:     omega-3 fatty acids-vitamin e (FISH OIL) 1,000 mg Cap, Take 1 Cap by mouth daily. , Disp: , Rfl:     No Known Allergies    ROS   Constitutional: negative  Ears, Nose, Mouth, Throat, and Face: negative  Respiratory: negative  Cardiovascular: hx of cabg. No angina currently.   Gastrointestinal: See HPI  Genitourinary:negative  Integument/Breast: negative  Hematologic/Lymphatic: negative  Behavioral/Psychiatric: negative  Allergic/Immunologic: negative      Physical Exam:     Visit Vitals  /82 (BP 1 Location: Left arm, BP Patient Position: Sitting)   Pulse 86   Temp 98.9 °F (37.2 °C) (Oral)   Resp 17   Ht 5' 6\" (1.676 m)   Wt 184 lb 3.2 oz (83.6 kg)   SpO2 94%   BMI 29.73 kg/m²       General - alert and oriented, no apparent distress  HEENT - NC/AT. No scleral icterus  Pulm - CTAB, normal inspiratory effort  CV - RRR, no M/R/G  Abd - soft, NT, ND. No palpable masses or organomegaly. Ext - warm, well perfused, no edema  Lymphatics - no cervical, supraclavicular or inguinal adenopathy noted. Skin - supple, no rashes  Psychiatric - normal affect, good mood    Labs  None    Imaging  None    Assessment:     Petra Brand is a 69 y.o. male with an adenomatous polyp of the cecum/appendiceal orifice that was not amenable to endoscopic removal.      Recommendations:     1. I have recommended a laparoscopic ileocecectomy for this polyp. As this is coming from the lumen of the appendix, I would like to get a CT scan before surgery to make sure there is not more there than is being appreciated on colonoscopy. He also needs to see Cardiology for clearance prior to surgery. I will place a referral for new cardiology evaluation as has does not follow with anyone currently. He will need to hold his plavix x 7 days prior to surgery. ERAS teaching done in the office. 40 mins of time was spent with the patient of which > 50% of the time involved face-to-face counseling of the patient regarding the proposed treatment plan.       Francisco Bhatia MD  12/5/2019    CC: MD Dr. Trisha Cabrera

## 2019-12-10 ENCOUNTER — OFFICE VISIT (OUTPATIENT)
Dept: CARDIOLOGY CLINIC | Age: 74
End: 2019-12-10

## 2019-12-10 VITALS
WEIGHT: 183 LBS | OXYGEN SATURATION: 97 % | SYSTOLIC BLOOD PRESSURE: 142 MMHG | DIASTOLIC BLOOD PRESSURE: 84 MMHG | RESPIRATION RATE: 16 BRPM | HEART RATE: 102 BPM | HEIGHT: 66 IN | BODY MASS INDEX: 29.41 KG/M2

## 2019-12-10 DIAGNOSIS — I25.10 CORONARY ARTERY DISEASE INVOLVING NATIVE CORONARY ARTERY OF NATIVE HEART WITHOUT ANGINA PECTORIS: Primary | ICD-10-CM

## 2019-12-10 DIAGNOSIS — E78.2 MIXED HYPERLIPIDEMIA: ICD-10-CM

## 2019-12-10 DIAGNOSIS — I10 ESSENTIAL HYPERTENSION: ICD-10-CM

## 2019-12-10 DIAGNOSIS — Z01.810 PRE-OPERATIVE CARDIOVASCULAR EXAMINATION: ICD-10-CM

## 2019-12-10 NOTE — PROGRESS NOTES
Petra Brand     1945       Ira Kruse MD, [facility]  Date of Visit-12/10/2019   PCP is Ray Sexton MD   [facility] [place]  [facility]  HPI:  Chon Quiroga is a 69 y.o. male   Preop evaluation with Dr. Ksenia Zapata patient has cecal polyp and will need eventual surgery  Pt has cecal polyp. Hs of CABG in 2006 with no recent cardiology visit. Prior TIA on Plavix. He saw Dr. Wendi Marino until 2015, though the pt had told the surgeon he had not seen a cardiologist in 10 years. Our notes indicate 3 vessel CABG July 2005. Cath in August 2009 with open [address] to LAD, open SVG to PDA, but the PDA was closed at the insertion site, open SVG to OM1. Echo 12/28/18 EF 65-70%. Overall the pt states he is doing well. Pt denies any lightheadedness upon standing. Pt states that at home his BP runs about 177-321'D systolic. Pt is a former smoker. He quit over 40 years ago. Pt states that his father had heart issues but his health problems started after the age of 54. Denies chest pain, edema, syncope or shortness of breath at rest, has no tachycardia, palpitations or sense of arrhythmia. EKG: ST at 102 WNL    Assessment/Plan:     1. Coronary artery disease involving native coronary artery of native heart without angina pectoris  Had CABG in 2005 follow-up cath showed the PDA was closed looks like his ejection fraction has been normal.  Pain free. Active. Has one graft that appears to have insertion site disease from a cath many years ago. Will plan stress imaging study.   - AMB POC EKG ROUTINE W/ 12 LEADS, INTER & REP    2. Pre-operative cardiovascular examination  No objection to planned surgery. Will get stress imaging, but that is more for long term care. In the short term, he can proceed with the needed surgery without delay. I have no objection to the planned  surgery.  Patient seems to be at a low risk for sony-operative severe adverse cardiac events. He does not need to wait for the stress test to go ahead and get his surgery done  3. Mixed hyperlipidemia  Lipids on high potency statin as appropriate for secondary prevention. Lab Results   Component Value Date/Time    LDL, calculated 66.2 12/28/2018 03:13 AM      4. Essential hypertension  Elevated today. Will repeat. His prior numbers have all been within range. Suspect this is due to anxiety. BP Readings from Last 6 Encounters:   12/16/19 (!) 189/116   12/10/19 142/84   12/05/19 146/82   11/20/19 139/80   10/31/19 130/70   10/07/19 144/75        F/u in 1 year  Future Appointments   Date Time Provider Amy Lacy   12/14/2020  9:20 AM Vianney Lopez  [address]      Patient Instructions   You will be scheduled for a stress echocardiogram. Please wear comfortable clothes and shoes. Please do not eat or drink anything other than water two hours prior to test. It has been scheduled at 2:00PM on Monday January 16th at [facility]. Please go to the [place] [id number] and Outpatient Registration at 1:30PM or 30 minutes prior to your appointment. We will see you back in one year. Key CAD CHF Meds             clopidogrel (PLAVIX) 75 mg tab (Taking) Take 1 Tab by mouth daily. atorvastatin (LIPITOR) 20 mg tablet (Taking) Take 1 Tab by mouth daily. omega-3 fatty acids-vitamin e (FISH OIL) 1,000 mg Cap (Taking) Take 1 Cap by mouth daily. Impression:   1. Coronary artery disease involving native coronary artery of native heart without angina pectoris    2. Pre-operative cardiovascular examination    3. Mixed hyperlipidemia    4. Essential hypertension       Cardiac History:   No specialty comments available.    Admitted for double vision 12/26/2018 at [facility] had bypass surgery 2005 at [facility] MCV allergies are none no blood transfusions or ulcers social quit smoking in 1978 drinks occasional coffee 1 cup a day is a  has lives with his significant other has no children family history mother  of cancer at 66 Father  of cancer at 67    16 system review of systems positive for lump in the neck needing glasses heartburn constipation thyroid disorder arthritis prostate difficulties and difficulty sleeping remainder of 12 system review of systems negative except as above he sees Miguel Edouard is his primary care physician    see supplement sheet, initialed and to be scanned by staff  Past Medical History:   Diagnosis Date    Acid indigestion     Arthritis 2012    Back pain     GERD (gastroesophageal reflux disease) 2012    HTN (hypertension) 2012    Hyperlipemia 2012    IH (inguinal hernia) 2015, small right , reducible IH .  Ill-defined condition 2018    TIA, double vision    Insomnia     Thyroid disorder 2012      Social Hx= reports that he has quit smoking. His smoking use included cigarettes. He has never used smokeless tobacco. He reports previous alcohol use. He reports that he does not use drugs. Exam and Labs:  BP (!) 170/100 (BP 1 Location: Left arm, BP Patient Position: Sitting)   Pulse (!) 102   Resp 16   Ht 5' 6\" (1.676 m)   Wt 183 lb (83 kg)   SpO2 97%   BMI 29.54 kg/m² Constitutional:  NAD, comfortable  Head: NC,AT. Eyes: No scleral icterus. Neck:  Neck supple. No JVD present. Throat: moist mucous membranes. Chest: Effort normal & normal respiratory excursion . Neurological: alert, conversant and oriented . Skin: Skin is not cold. No obvious systemic rash noted. Not diaphoretic. No erythema. Psychiatric:  Grossly normal mood and affect. Behavior appears normal. Extremities:  no clubbing or cyanosis. Abdomen: non distended    Lungs:breath sounds normal. No stridor. distress, wheezes or  Rales. Heart: normal rate, regular rhythm, normal S1, S2, no murmurs, rubs, clicks or gallops , PMI non displaced. Edema: Edema is none.   Lab Results   Component Value Date/Time    Cholesterol, total 145 12/28/2018 03:13 AM    HDL Cholesterol 35 12/28/2018 03:13 AM    LDL, calculated 66.2 12/28/2018 03:13 AM    Triglyceride 219 (H) 12/28/2018 03:13 AM    CHOL/HDL Ratio 4.1 12/28/2018 03:13 AM     Lab Results   Component Value Date/Time    Sodium 137 12/27/2018 04:36 PM    Potassium 3.7 12/27/2018 04:36 PM    Chloride 103 12/27/2018 04:36 PM    CO2 25 12/27/2018 04:36 PM    Anion gap 9 12/27/2018 04:36 PM    Glucose 99 12/27/2018 04:36 PM    BUN 14 12/27/2018 04:36 PM    Creatinine 0.90 12/27/2018 04:36 PM    BUN/Creatinine ratio 16 12/27/2018 04:36 PM    GFR est AA >60 12/27/2018 04:36 PM    GFR est non-AA >60 12/27/2018 04:36 PM    Calcium 9.3 12/27/2018 04:36 PM      Wt Readings from Last 3 Encounters:   12/10/19 183 lb (83 kg)   12/05/19 184 lb 3.2 oz (83.6 kg)   11/20/19 185 lb (83.9 kg)      BP Readings from Last 3 Encounters:   12/10/19 (!) 170/100   12/05/19 146/82   11/20/19 139/80      Current Outpatient Medications   Medication Sig    levothyroxine (SYNTHROID) 125 mcg tablet Take 1 Tab by mouth Daily (before breakfast).  pantoprazole (PROTONIX) 40 mg tablet Take 1 Tab by mouth daily.  tamsulosin (FLOMAX) 0.4 mg capsule Take 1 Cap by mouth daily.  clopidogrel (PLAVIX) 75 mg tab Take 1 Tab by mouth daily.  acetaminophen (TYLENOL) 325 mg tablet Take 2 Tabs by mouth every four (4) hours as needed for Pain or Fever (For temp greater than or equal to 38.5 C or 101.3 F (Unless hepatic failure or contrindicated). Give first line for fever. ).  magnesium oxide (MAG-OX) 400 mg tablet Take 400 mg by mouth daily.  atorvastatin (LIPITOR) 20 mg tablet Take 1 Tab by mouth daily.  traMADol (ULTRAM) 50 mg tablet Take 1 Tab by mouth every six (6) hours as needed for Pain.  multivitamin (ONE A DAY) tablet Take 1 Tab by mouth daily.  omega-3 fatty acids-vitamin e (FISH OIL) 1,000 mg Cap Take 1 Cap by mouth daily. No current facility-administered medications for this visit. Impression see above.       Written by Arden Verdugo, as dictated by Nasim Herrera MD.

## 2019-12-10 NOTE — PROGRESS NOTES
Visit Vitals  BP (!) 170/100 (BP 1 Location: Left arm, BP Patient Position: Sitting)   Pulse (!) 102   Resp 16   Ht 5' 6\" (1.676 m)   Wt 183 lb (83 kg)   SpO2 97%   BMI 29.54 kg/m²

## 2019-12-10 NOTE — PATIENT INSTRUCTIONS
You will be scheduled for a stress echocardiogram. Please wear comfortable clothes and shoes. Please do not eat or drink anything other than water two hours prior to test. It has been scheduled at 2:00PM on Monday January 16th at [facility]. Please go to the [place] [address] and Outpatient Registration at 1:30PM or 30 minutes prior to your appointment. We will see you back in one year.

## 2019-12-10 NOTE — Clinical Note
12/10/19 Patient: Janessa Morley YOB: 1945 Date of Visit: 12/10/2019 Roya Shrestha MD 
[id number] [place] [phone] VIA In Basket Dear Roya Shrestha MD, Thank you for referring Mr. Petra Brand to [facility] for evaluation. My notes for this consultation are attached. If you have questions, please do not hesitate to call me. I look forward to following your patient along with you.  
 
 
Sincerely, 
 
Lissette Shaw MD

## 2019-12-13 ENCOUNTER — HOSPITAL ENCOUNTER (OUTPATIENT)
Dept: CT IMAGING | Age: 74
Discharge: HOME | End: 2019-12-13
Attending: SURGERY
Payer: MEDICARE

## 2019-12-13 DIAGNOSIS — D12.0 ADENOMATOUS POLYP OF CECUM: ICD-10-CM

## 2019-12-13 LAB — CREAT BLD-MCNC: 0.8 MG/DL (ref 0.6–1.3)

## 2019-12-13 PROCEDURE — 74177 CT ABD & PELVIS W/CONTRAST: CPT

## 2019-12-13 PROCEDURE — 74011636320 HC RX REV CODE- 636/320: Performed by: RADIOLOGY

## 2019-12-13 PROCEDURE — 82565 ASSAY OF CREATININE: CPT

## 2019-12-13 PROCEDURE — 74011000258 HC RX REV CODE- 258: Performed by: RADIOLOGY

## 2019-12-13 RX ORDER — SODIUM CHLORIDE 0.9 % (FLUSH) 0.9 %
10 SYRINGE (ML) INJECTION
Status: COMPLETED | OUTPATIENT
Start: 2019-12-13 | End: 2019-12-13

## 2019-12-13 RX ADMIN — IOHEXOL 50 ML: 240 INJECTION, SOLUTION INTRATHECAL; INTRAVASCULAR; INTRAVENOUS; ORAL at 13:00

## 2019-12-13 RX ADMIN — Medication 10 ML: at 13:00

## 2019-12-13 RX ADMIN — SODIUM CHLORIDE 100 ML: 900 INJECTION, SOLUTION INTRAVENOUS at 13:00

## 2019-12-13 RX ADMIN — IOPAMIDOL 100 ML: 755 INJECTION, SOLUTION INTRAVENOUS at 13:00

## 2019-12-16 ENCOUNTER — HOSPITAL ENCOUNTER (OUTPATIENT)
Dept: NON INVASIVE DIAGNOSTICS | Age: 74
Discharge: HOME | End: 2019-12-16
Attending: SPECIALIST
Payer: MEDICARE

## 2019-12-16 VITALS
SYSTOLIC BLOOD PRESSURE: 189 MMHG | BODY MASS INDEX: 29.41 KG/M2 | WEIGHT: 183 LBS | HEIGHT: 66 IN | DIASTOLIC BLOOD PRESSURE: 116 MMHG

## 2019-12-16 DIAGNOSIS — I10 ESSENTIAL HYPERTENSION: ICD-10-CM

## 2019-12-16 DIAGNOSIS — Z01.810 PRE-OPERATIVE CARDIOVASCULAR EXAMINATION: ICD-10-CM

## 2019-12-16 DIAGNOSIS — E78.2 MIXED HYPERLIPIDEMIA: ICD-10-CM

## 2019-12-16 DIAGNOSIS — I25.10 CORONARY ARTERY DISEASE INVOLVING NATIVE CORONARY ARTERY OF NATIVE HEART WITHOUT ANGINA PECTORIS: ICD-10-CM

## 2019-12-16 PROCEDURE — 93351 STRESS TTE COMPLETE: CPT

## 2019-12-17 ENCOUNTER — TELEPHONE (OUTPATIENT)
Dept: SURGERY | Age: 74
End: 2019-12-17

## 2019-12-17 LAB
STRESS ANGINA INDEX: 1
STRESS BASELINE DIAS BP: 94 MMHG
STRESS BASELINE HR: 111 BPM
STRESS BASELINE SYS BP: 151 MMHG
STRESS ESTIMATED WORKLOAD: 6.1 METS
STRESS EXERCISE DUR MIN: NORMAL
STRESS PEAK DIAS BP: 101 MMHG
STRESS PEAK SYS BP: 204 MMHG
STRESS PERCENT HR ACHIEVED: 103 %
STRESS POST PEAK HR: 153 BPM
STRESS RATE PRESSURE PRODUCT: NORMAL BPM*MMHG
STRESS ST DEPRESSION: 0.5 MM
STRESS TARGET HR: 148 BPM

## 2019-12-17 NOTE — PROGRESS NOTES
Exercise stress echo is normal.   I have no objection to the planned  surgery. Patient seems to be at a low risk for sony-operative severe adverse cardiac events.   Germán please send to surgery -low risk

## 2019-12-17 NOTE — PROGRESS NOTES
Exercise stress echo is normal.   I have no objection to the planned  surgery. Patient seems to be at a low risk for sony-operative severe adverse cardiac events.    Germán send to surgery -low risk letter  thanks

## 2019-12-24 ENCOUNTER — TELEPHONE (OUTPATIENT)
Dept: SURGERY | Age: 74
End: 2019-12-24

## 2019-12-24 ENCOUNTER — DOCUMENTATION ONLY (OUTPATIENT)
Dept: SURGERY | Age: 74
End: 2019-12-24

## 2019-12-24 NOTE — PROGRESS NOTES
Attempted to reach patient by telephone. A message was left for return call. Letter faxed, received confirmation.

## 2019-12-24 NOTE — PROGRESS NOTES
Received fax from Dr. Ardyce Severance ([facility] [address] [facility]) clearing patient for surgery. Will forward to Dr. Rossana Phillip for review.

## 2019-12-30 NOTE — PROGRESS NOTES
Two patient identifiers verified. Patient returned my called and given results. Let him know clearance letter was sent as well. Patient verbalized understanding and denies any further questions or concerns at this time.

## 2020-01-08 ENCOUNTER — HOSPITAL ENCOUNTER (OUTPATIENT)
Dept: GENERAL RADIOLOGY | Age: 75
Discharge: HOME | End: 2020-01-08
Attending: SURGERY
Payer: MEDICARE

## 2020-01-08 ENCOUNTER — HOSPITAL ENCOUNTER (OUTPATIENT)
Dept: PREADMISSION TESTING | Age: 75
Discharge: HOME | DRG: 331 | End: 2020-01-08
Payer: MEDICARE

## 2020-01-08 VITALS
WEIGHT: 185 LBS | HEIGHT: 66 IN | HEART RATE: 93 BPM | RESPIRATION RATE: 18 BRPM | BODY MASS INDEX: 29.73 KG/M2 | TEMPERATURE: 98.2 F | SYSTOLIC BLOOD PRESSURE: 160 MMHG | DIASTOLIC BLOOD PRESSURE: 80 MMHG

## 2020-01-08 LAB
ABO + RH BLD: NORMAL
ALBUMIN SERPL-MCNC: 4.2 G/DL (ref 3.5–5)
ALBUMIN/GLOB SERPL: 1.1 {RATIO} (ref 1.1–2.2)
ALP SERPL-CCNC: 72 U/L (ref 45–117)
ALT SERPL-CCNC: 50 U/L (ref 12–78)
ANION GAP SERPL CALC-SCNC: 5 MMOL/L (ref 5–15)
APTT PPP: 26.5 SEC (ref 22.1–32)
AST SERPL-CCNC: 37 U/L (ref 15–37)
BILIRUB SERPL-MCNC: 0.9 MG/DL (ref 0.2–1)
BLOOD GROUP ANTIBODIES SERPL: NORMAL
BUN SERPL-MCNC: 17 MG/DL (ref 6–20)
BUN/CREAT SERPL: 21 (ref 12–20)
CALCIUM SERPL-MCNC: 9.7 MG/DL (ref 8.5–10.1)
CHLORIDE SERPL-SCNC: 103 MMOL/L (ref 97–108)
CO2 SERPL-SCNC: 28 MMOL/L (ref 21–32)
CREAT SERPL-MCNC: 0.81 MG/DL (ref 0.7–1.3)
ERYTHROCYTE [DISTWIDTH] IN BLOOD BY AUTOMATED COUNT: 12.7 % (ref 11.5–14.5)
EST. AVERAGE GLUCOSE BLD GHB EST-MCNC: 128 MG/DL
GLOBULIN SER CALC-MCNC: 3.9 G/DL (ref 2–4)
GLUCOSE SERPL-MCNC: 87 MG/DL (ref 65–100)
HBA1C MFR BLD: 6.1 % (ref 4–5.6)
HCT VFR BLD AUTO: 45.8 % (ref 36.6–50.3)
HGB BLD-MCNC: 15.9 G/DL (ref 12.1–17)
INR PPP: 1 (ref 0.9–1.1)
MAGNESIUM SERPL-MCNC: 2.3 MG/DL (ref 1.6–2.4)
MCH RBC QN AUTO: 31.2 PG (ref 26–34)
MCHC RBC AUTO-ENTMCNC: 34.7 G/DL (ref 30–36.5)
MCV RBC AUTO: 90 FL (ref 80–99)
NRBC # BLD: 0 K/UL (ref 0–0.01)
NRBC BLD-RTO: 0 PER 100 WBC
PHOSPHATE SERPL-MCNC: 2.9 MG/DL (ref 2.6–4.7)
PLATELET # BLD AUTO: 182 K/UL (ref 150–400)
PMV BLD AUTO: 9.2 FL (ref 8.9–12.9)
POTASSIUM SERPL-SCNC: 4.4 MMOL/L (ref 3.5–5.1)
PROT SERPL-MCNC: 8.1 G/DL (ref 6.4–8.2)
PROTHROMBIN TIME: 10.6 SEC (ref 9–11.1)
RBC # BLD AUTO: 5.09 M/UL (ref 4.1–5.7)
SODIUM SERPL-SCNC: 136 MMOL/L (ref 136–145)
SPECIMEN EXP DATE BLD: NORMAL
THERAPEUTIC RANGE,PTTT: NORMAL SECS (ref 58–77)
WBC # BLD AUTO: 6.5 K/UL (ref 4.1–11.1)

## 2020-01-08 PROCEDURE — 71046 X-RAY EXAM CHEST 2 VIEWS: CPT

## 2020-01-08 PROCEDURE — 84100 ASSAY OF PHOSPHORUS: CPT

## 2020-01-08 PROCEDURE — 86900 BLOOD TYPING SEROLOGIC ABO: CPT

## 2020-01-08 PROCEDURE — 80053 COMPREHEN METABOLIC PANEL: CPT

## 2020-01-08 PROCEDURE — 85027 COMPLETE CBC AUTOMATED: CPT

## 2020-01-08 PROCEDURE — 85610 PROTHROMBIN TIME: CPT

## 2020-01-08 PROCEDURE — 83735 ASSAY OF MAGNESIUM: CPT

## 2020-01-08 PROCEDURE — 85730 THROMBOPLASTIN TIME PARTIAL: CPT

## 2020-01-08 PROCEDURE — 36415 COLL VENOUS BLD VENIPUNCTURE: CPT

## 2020-01-08 PROCEDURE — 83036 HEMOGLOBIN GLYCOSYLATED A1C: CPT

## 2020-01-08 NOTE — PERIOP NOTES
PAT INTERVIEW COMPLETED WITH PT.  INFECTION PREVENTION INFORMATION GIVEN TO PT.  PT WAS GIVEN THE OPPORTUNITY TO ASK ADDITIONAL QUESTIONS.     ERAS BOOKLET REVIEWED WITH PT, HE WAS GIVEN INCENTIVE SPIROMETER AND WAS ABLE TO DEMONSTRATE PROPER USE OF I.S.      PT WAS GIVEN DIRECTIONS TO HAVE CXR COMPLETED

## 2020-01-10 ENCOUNTER — ANESTHESIA EVENT (OUTPATIENT)
Dept: SURGERY | Age: 75
DRG: 331 | End: 2020-01-10
Payer: MEDICARE

## 2020-01-10 ENCOUNTER — HOSPITAL ENCOUNTER (INPATIENT)
Age: 75
LOS: 2 days | Discharge: HOME | DRG: 331 | End: 2020-01-12
Attending: SURGERY | Admitting: SURGERY
Payer: MEDICARE

## 2020-01-10 ENCOUNTER — ANESTHESIA (OUTPATIENT)
Dept: SURGERY | Age: 75
DRG: 331 | End: 2020-01-10
Payer: MEDICARE

## 2020-01-10 DIAGNOSIS — D12.0 ADENOMATOUS POLYP OF CECUM: Primary | ICD-10-CM

## 2020-01-10 PROCEDURE — 74011250637 HC RX REV CODE- 250/637: Performed by: SURGERY

## 2020-01-10 PROCEDURE — 77030002933 HC SUT MCRYL J&J -A: Performed by: SURGERY

## 2020-01-10 PROCEDURE — 77030012770 HC TRCR OPT FX AMR -B: Performed by: SURGERY

## 2020-01-10 PROCEDURE — 74011000250 HC RX REV CODE- 250: Performed by: NURSE ANESTHETIST, CERTIFIED REGISTERED

## 2020-01-10 PROCEDURE — 74011250636 HC RX REV CODE- 250/636: Performed by: ANESTHESIOLOGY

## 2020-01-10 PROCEDURE — 77030002996 HC SUT SLK J&J -A: Performed by: SURGERY

## 2020-01-10 PROCEDURE — 77030011640 HC PAD GRND REM COVD -A: Performed by: SURGERY

## 2020-01-10 PROCEDURE — P9045 ALBUMIN (HUMAN), 5%, 250 ML: HCPCS | Performed by: NURSE ANESTHETIST, CERTIFIED REGISTERED

## 2020-01-10 PROCEDURE — 88309 TISSUE EXAM BY PATHOLOGIST: CPT

## 2020-01-10 PROCEDURE — 74011250636 HC RX REV CODE- 250/636: Performed by: NURSE ANESTHETIST, CERTIFIED REGISTERED

## 2020-01-10 PROCEDURE — 74011000258 HC RX REV CODE- 258: Performed by: SURGERY

## 2020-01-10 PROCEDURE — 77030020829: Performed by: SURGERY

## 2020-01-10 PROCEDURE — 76210000017 HC OR PH I REC 1.5 TO 2 HR: Performed by: SURGERY

## 2020-01-10 PROCEDURE — 77030036732 HC RELD STPLR VASC J&J -F: Performed by: SURGERY

## 2020-01-10 PROCEDURE — 77030008684 HC TU ET CUF COVD -B: Performed by: NURSE ANESTHETIST, CERTIFIED REGISTERED

## 2020-01-10 PROCEDURE — 77030040361 HC SLV COMPR DVT MDII -B: Performed by: SURGERY

## 2020-01-10 PROCEDURE — 77030010507 HC ADH SKN DERMBND J&J -B: Performed by: SURGERY

## 2020-01-10 PROCEDURE — 65270000029 HC RM PRIVATE

## 2020-01-10 PROCEDURE — 76060000037 HC ANESTHESIA 3 TO 3.5 HR: Performed by: SURGERY

## 2020-01-10 PROCEDURE — 77030020263 HC SOL INJ SOD CL0.9% LFCR 1000ML: Performed by: SURGERY

## 2020-01-10 PROCEDURE — 74011000250 HC RX REV CODE- 250: Performed by: SURGERY

## 2020-01-10 PROCEDURE — 88307 TISSUE EXAM BY PATHOLOGIST: CPT

## 2020-01-10 PROCEDURE — 77030009968 HC RELD STPLR ENDOSC J&J -D: Performed by: SURGERY

## 2020-01-10 PROCEDURE — 77030027876 HC STPLR ENDOSC FLX PWR J&J -G1: Performed by: SURGERY

## 2020-01-10 PROCEDURE — 77030008771 HC TU NG SALEM SUMP -A: Performed by: NURSE ANESTHETIST, CERTIFIED REGISTERED

## 2020-01-10 PROCEDURE — 77030031139 HC SUT VCRL2 J&J -A: Performed by: SURGERY

## 2020-01-10 PROCEDURE — 88342 IMHCHEM/IMCYTCHM 1ST ANTB: CPT

## 2020-01-10 PROCEDURE — 74011000258 HC RX REV CODE- 258: Performed by: NURSE ANESTHETIST, CERTIFIED REGISTERED

## 2020-01-10 PROCEDURE — 77030008608 HC TRCR ENDOSC SMTH AMR -B: Performed by: SURGERY

## 2020-01-10 PROCEDURE — 77030013079 HC BLNKT BAIR HGGR 3M -A: Performed by: NURSE ANESTHETIST, CERTIFIED REGISTERED

## 2020-01-10 PROCEDURE — 77030002966 HC SUT PDS J&J -A: Performed by: SURGERY

## 2020-01-10 PROCEDURE — 77030026438 HC STYL ET INTUB CARD -A: Performed by: NURSE ANESTHETIST, CERTIFIED REGISTERED

## 2020-01-10 PROCEDURE — 77030003580 HC NDL INSUF VERES J&J -B: Performed by: SURGERY

## 2020-01-10 PROCEDURE — 77030005513 HC CATH URETH FOL11 MDII -B: Performed by: SURGERY

## 2020-01-10 PROCEDURE — 77030020747 HC TU INSUF ENDOSC TELE -A: Performed by: SURGERY

## 2020-01-10 PROCEDURE — 77030009527 HC GEL PRT SYS AMR -E: Performed by: SURGERY

## 2020-01-10 PROCEDURE — 76010000133 HC OR TIME 3 TO 3.5 HR: Performed by: SURGERY

## 2020-01-10 PROCEDURE — 0DTH4ZZ RESECTION OF CECUM, PERCUTANEOUS ENDOSCOPIC APPROACH: ICD-10-PCS | Performed by: SURGERY

## 2020-01-10 PROCEDURE — 74011250636 HC RX REV CODE- 250/636: Performed by: SURGERY

## 2020-01-10 PROCEDURE — 77030016151 HC PROTCTR LNS DFOG COVD -B: Performed by: SURGERY

## 2020-01-10 RX ORDER — NEOSTIGMINE METHYLSULFATE 1 MG/ML
INJECTION, SOLUTION INTRAVENOUS AS NEEDED
Status: DISCONTINUED | OUTPATIENT
Start: 2020-01-10 | End: 2020-01-10 | Stop reason: HOSPADM

## 2020-01-10 RX ORDER — ROCURONIUM BROMIDE 10 MG/ML
INJECTION, SOLUTION INTRAVENOUS AS NEEDED
Status: DISCONTINUED | OUTPATIENT
Start: 2020-01-10 | End: 2020-01-10 | Stop reason: HOSPADM

## 2020-01-10 RX ORDER — KETOROLAC TROMETHAMINE 30 MG/ML
15 INJECTION, SOLUTION INTRAMUSCULAR; INTRAVENOUS EVERY 6 HOURS
Status: COMPLETED | OUTPATIENT
Start: 2020-01-10 | End: 2020-01-11

## 2020-01-10 RX ORDER — FENTANYL CITRATE 50 UG/ML
INJECTION, SOLUTION INTRAMUSCULAR; INTRAVENOUS AS NEEDED
Status: DISCONTINUED | OUTPATIENT
Start: 2020-01-10 | End: 2020-01-10 | Stop reason: HOSPADM

## 2020-01-10 RX ORDER — ACETAMINOPHEN 500 MG
TABLET ORAL
Status: DISPENSED
Start: 2020-01-10 | End: 2020-01-11

## 2020-01-10 RX ORDER — HYDROMORPHONE HYDROCHLORIDE 1 MG/ML
0.5 INJECTION, SOLUTION INTRAMUSCULAR; INTRAVENOUS; SUBCUTANEOUS
Status: DISCONTINUED | OUTPATIENT
Start: 2020-01-10 | End: 2020-01-12 | Stop reason: HOSPADM

## 2020-01-10 RX ORDER — TAMSULOSIN HYDROCHLORIDE 0.4 MG/1
0.4 CAPSULE ORAL DAILY
Status: DISCONTINUED | OUTPATIENT
Start: 2020-01-11 | End: 2020-01-12 | Stop reason: HOSPADM

## 2020-01-10 RX ORDER — SODIUM CHLORIDE, SODIUM LACTATE, POTASSIUM CHLORIDE, CALCIUM CHLORIDE 600; 310; 30; 20 MG/100ML; MG/100ML; MG/100ML; MG/100ML
75 INJECTION, SOLUTION INTRAVENOUS CONTINUOUS
Status: DISCONTINUED | OUTPATIENT
Start: 2020-01-10 | End: 2020-01-10 | Stop reason: HOSPADM

## 2020-01-10 RX ORDER — SODIUM CHLORIDE, SODIUM LACTATE, POTASSIUM CHLORIDE, CALCIUM CHLORIDE 600; 310; 30; 20 MG/100ML; MG/100ML; MG/100ML; MG/100ML
INJECTION, SOLUTION INTRAVENOUS
Status: DISCONTINUED | OUTPATIENT
Start: 2020-01-10 | End: 2020-01-10 | Stop reason: HOSPADM

## 2020-01-10 RX ORDER — CELECOXIB 100 MG/1
100 CAPSULE ORAL 2 TIMES DAILY
Status: DISCONTINUED | OUTPATIENT
Start: 2020-01-11 | End: 2020-01-12 | Stop reason: HOSPADM

## 2020-01-10 RX ORDER — SODIUM CHLORIDE, SODIUM LACTATE, POTASSIUM CHLORIDE, CALCIUM CHLORIDE 600; 310; 30; 20 MG/100ML; MG/100ML; MG/100ML; MG/100ML
75 INJECTION, SOLUTION INTRAVENOUS CONTINUOUS
Status: DISPENSED | OUTPATIENT
Start: 2020-01-10 | End: 2020-01-11

## 2020-01-10 RX ORDER — CELECOXIB 200 MG/1
200 CAPSULE ORAL ONCE
Status: COMPLETED | OUTPATIENT
Start: 2020-01-10 | End: 2020-01-10

## 2020-01-10 RX ORDER — KETOROLAC TROMETHAMINE 30 MG/ML
INJECTION, SOLUTION INTRAMUSCULAR; INTRAVENOUS
Status: DISPENSED
Start: 2020-01-10 | End: 2020-01-11

## 2020-01-10 RX ORDER — ENOXAPARIN SODIUM 100 MG/ML
40 INJECTION SUBCUTANEOUS EVERY 24 HOURS
Status: DISCONTINUED | OUTPATIENT
Start: 2020-01-10 | End: 2020-01-12 | Stop reason: HOSPADM

## 2020-01-10 RX ORDER — SUCCINYLCHOLINE CHLORIDE 20 MG/ML
INJECTION INTRAMUSCULAR; INTRAVENOUS AS NEEDED
Status: DISCONTINUED | OUTPATIENT
Start: 2020-01-10 | End: 2020-01-10 | Stop reason: HOSPADM

## 2020-01-10 RX ORDER — SODIUM CHLORIDE, SODIUM LACTATE, POTASSIUM CHLORIDE, CALCIUM CHLORIDE 600; 310; 30; 20 MG/100ML; MG/100ML; MG/100ML; MG/100ML
INJECTION, SOLUTION INTRAVENOUS
Status: DISCONTINUED | OUTPATIENT
Start: 2020-01-10 | End: 2020-01-10

## 2020-01-10 RX ORDER — LIDOCAINE HYDROCHLORIDE 20 MG/ML
INJECTION, SOLUTION EPIDURAL; INFILTRATION; INTRACAUDAL; PERINEURAL AS NEEDED
Status: DISCONTINUED | OUTPATIENT
Start: 2020-01-10 | End: 2020-01-10 | Stop reason: HOSPADM

## 2020-01-10 RX ORDER — GABAPENTIN 300 MG/1
600 CAPSULE ORAL ONCE
Status: COMPLETED | OUTPATIENT
Start: 2020-01-10 | End: 2020-01-10

## 2020-01-10 RX ORDER — ALVIMOPAN 12 MG/1
12 CAPSULE ORAL 2 TIMES DAILY
Status: DISCONTINUED | OUTPATIENT
Start: 2020-01-11 | End: 2020-01-12 | Stop reason: HOSPADM

## 2020-01-10 RX ORDER — ATORVASTATIN CALCIUM 20 MG/1
20 TABLET, FILM COATED ORAL
Status: DISCONTINUED | OUTPATIENT
Start: 2020-01-10 | End: 2020-01-12 | Stop reason: HOSPADM

## 2020-01-10 RX ORDER — ALBUMIN HUMAN 50 G/1000ML
SOLUTION INTRAVENOUS AS NEEDED
Status: DISCONTINUED | OUTPATIENT
Start: 2020-01-10 | End: 2020-01-10 | Stop reason: HOSPADM

## 2020-01-10 RX ORDER — LIDOCAINE HYDROCHLORIDE ANHYDROUS AND DEXTROSE MONOHYDRATE .8; 5 G/100ML; G/100ML
INJECTION, SOLUTION INTRAVENOUS
Status: DISCONTINUED | OUTPATIENT
Start: 2020-01-10 | End: 2020-01-10 | Stop reason: HOSPADM

## 2020-01-10 RX ORDER — ACETAMINOPHEN 500 MG
1000 TABLET ORAL EVERY 6 HOURS
Status: DISCONTINUED | OUTPATIENT
Start: 2020-01-10 | End: 2020-01-12 | Stop reason: HOSPADM

## 2020-01-10 RX ORDER — MAGNESIUM SULFATE HEPTAHYDRATE 40 MG/ML
INJECTION, SOLUTION INTRAVENOUS AS NEEDED
Status: DISCONTINUED | OUTPATIENT
Start: 2020-01-10 | End: 2020-01-10 | Stop reason: HOSPADM

## 2020-01-10 RX ORDER — NALOXONE HYDROCHLORIDE 0.4 MG/ML
0.4 INJECTION, SOLUTION INTRAMUSCULAR; INTRAVENOUS; SUBCUTANEOUS AS NEEDED
Status: DISCONTINUED | OUTPATIENT
Start: 2020-01-10 | End: 2020-01-12 | Stop reason: HOSPADM

## 2020-01-10 RX ORDER — PHENYLEPHRINE HCL IN 0.9% NACL 0.4MG/10ML
SYRINGE (ML) INTRAVENOUS AS NEEDED
Status: DISCONTINUED | OUTPATIENT
Start: 2020-01-10 | End: 2020-01-10 | Stop reason: HOSPADM

## 2020-01-10 RX ORDER — KETAMINE HYDROCHLORIDE 100 MG/ML
INJECTION, SOLUTION INTRAMUSCULAR; INTRAVENOUS AS NEEDED
Status: DISCONTINUED | OUTPATIENT
Start: 2020-01-10 | End: 2020-01-10 | Stop reason: HOSPADM

## 2020-01-10 RX ORDER — BUPIVACAINE HYDROCHLORIDE AND EPINEPHRINE 2.5; 5 MG/ML; UG/ML
30 INJECTION, SOLUTION EPIDURAL; INFILTRATION; INTRACAUDAL; PERINEURAL ONCE
Status: COMPLETED | OUTPATIENT
Start: 2020-01-10 | End: 2020-01-10

## 2020-01-10 RX ORDER — DEXAMETHASONE SODIUM PHOSPHATE 4 MG/ML
INJECTION, SOLUTION INTRA-ARTICULAR; INTRALESIONAL; INTRAMUSCULAR; INTRAVENOUS; SOFT TISSUE AS NEEDED
Status: DISCONTINUED | OUTPATIENT
Start: 2020-01-10 | End: 2020-01-10 | Stop reason: HOSPADM

## 2020-01-10 RX ORDER — GLYCOPYRROLATE 0.2 MG/ML
INJECTION INTRAMUSCULAR; INTRAVENOUS AS NEEDED
Status: DISCONTINUED | OUTPATIENT
Start: 2020-01-10 | End: 2020-01-10 | Stop reason: HOSPADM

## 2020-01-10 RX ORDER — ENOXAPARIN SODIUM 100 MG/ML
INJECTION SUBCUTANEOUS
Status: DISPENSED
Start: 2020-01-10 | End: 2020-01-11

## 2020-01-10 RX ORDER — LIDOCAINE HYDROCHLORIDE ANHYDROUS AND DEXTROSE MONOHYDRATE .8; 5 G/100ML; G/100ML
1 INJECTION, SOLUTION INTRAVENOUS CONTINUOUS
Status: DISCONTINUED | OUTPATIENT
Start: 2020-01-10 | End: 2020-01-12 | Stop reason: HOSPADM

## 2020-01-10 RX ORDER — ALVIMOPAN 12 MG/1
12 CAPSULE ORAL ONCE
Status: COMPLETED | OUTPATIENT
Start: 2020-01-10 | End: 2020-01-10

## 2020-01-10 RX ORDER — OXYCODONE HYDROCHLORIDE 5 MG/1
5 TABLET ORAL
Status: DISCONTINUED | OUTPATIENT
Start: 2020-01-10 | End: 2020-01-12 | Stop reason: HOSPADM

## 2020-01-10 RX ORDER — ONDANSETRON 4 MG/1
4 TABLET, ORALLY DISINTEGRATING ORAL
Status: DISCONTINUED | OUTPATIENT
Start: 2020-01-10 | End: 2020-01-12 | Stop reason: HOSPADM

## 2020-01-10 RX ORDER — PANTOPRAZOLE SODIUM 40 MG/1
40 TABLET, DELAYED RELEASE ORAL DAILY
Status: DISCONTINUED | OUTPATIENT
Start: 2020-01-11 | End: 2020-01-12 | Stop reason: HOSPADM

## 2020-01-10 RX ORDER — MIDAZOLAM HYDROCHLORIDE 1 MG/ML
INJECTION, SOLUTION INTRAMUSCULAR; INTRAVENOUS AS NEEDED
Status: DISCONTINUED | OUTPATIENT
Start: 2020-01-10 | End: 2020-01-10 | Stop reason: HOSPADM

## 2020-01-10 RX ORDER — ATORVASTATIN CALCIUM 40 MG/1
TABLET, FILM COATED ORAL
Status: DISPENSED
Start: 2020-01-10 | End: 2020-01-11

## 2020-01-10 RX ORDER — FENTANYL CITRATE 50 UG/ML
25 INJECTION, SOLUTION INTRAMUSCULAR; INTRAVENOUS
Status: COMPLETED | OUTPATIENT
Start: 2020-01-10 | End: 2020-01-10

## 2020-01-10 RX ORDER — ONDANSETRON 2 MG/ML
INJECTION INTRAMUSCULAR; INTRAVENOUS AS NEEDED
Status: DISCONTINUED | OUTPATIENT
Start: 2020-01-10 | End: 2020-01-10 | Stop reason: HOSPADM

## 2020-01-10 RX ORDER — PROPOFOL 10 MG/ML
INJECTION, EMULSION INTRAVENOUS AS NEEDED
Status: DISCONTINUED | OUTPATIENT
Start: 2020-01-10 | End: 2020-01-10 | Stop reason: HOSPADM

## 2020-01-10 RX ORDER — ONDANSETRON 2 MG/ML
4 INJECTION INTRAMUSCULAR; INTRAVENOUS AS NEEDED
Status: DISCONTINUED | OUTPATIENT
Start: 2020-01-10 | End: 2020-01-10

## 2020-01-10 RX ORDER — LEVOTHYROXINE SODIUM 125 UG/1
125 TABLET ORAL
Status: DISCONTINUED | OUTPATIENT
Start: 2020-01-11 | End: 2020-01-12 | Stop reason: HOSPADM

## 2020-01-10 RX ORDER — ACETAMINOPHEN 500 MG
1000 TABLET ORAL ONCE
Status: COMPLETED | OUTPATIENT
Start: 2020-01-10 | End: 2020-01-10

## 2020-01-10 RX ORDER — MIDAZOLAM HYDROCHLORIDE 1 MG/ML
1 INJECTION, SOLUTION INTRAMUSCULAR; INTRAVENOUS AS NEEDED
Status: DISCONTINUED | OUTPATIENT
Start: 2020-01-10 | End: 2020-01-10 | Stop reason: HOSPADM

## 2020-01-10 RX ORDER — OXYCODONE HYDROCHLORIDE 5 MG/1
5 TABLET ORAL
Qty: 40 TAB | Refills: 0 | Status: SHIPPED | OUTPATIENT
Start: 2020-01-10 | End: 2020-01-17

## 2020-01-10 RX ORDER — THERA TABS 400 MCG
1 TAB ORAL DAILY
Status: DISCONTINUED | OUTPATIENT
Start: 2020-01-11 | End: 2020-01-12 | Stop reason: HOSPADM

## 2020-01-10 RX ADMIN — ALBUMIN (HUMAN) 250 ML: 12.5 INJECTION, SOLUTION INTRAVENOUS at 14:50

## 2020-01-10 RX ADMIN — ROCURONIUM BROMIDE 45 MG: 10 SOLUTION INTRAVENOUS at 14:40

## 2020-01-10 RX ADMIN — PROPOFOL 50 MG: 10 INJECTION, EMULSION INTRAVENOUS at 16:07

## 2020-01-10 RX ADMIN — KETOROLAC TROMETHAMINE 15 MG: 30 INJECTION, SOLUTION INTRAMUSCULAR at 22:49

## 2020-01-10 RX ADMIN — CEFOTETAN DISODIUM 2 G: 2 INJECTION, POWDER, FOR SOLUTION INTRAMUSCULAR; INTRAVENOUS at 14:41

## 2020-01-10 RX ADMIN — GABAPENTIN 600 MG: 300 CAPSULE ORAL at 13:02

## 2020-01-10 RX ADMIN — ACETAMINOPHEN 1000 MG: 500 TABLET ORAL at 13:01

## 2020-01-10 RX ADMIN — GLYCOPYRROLATE 0.4 MG: 0.2 INJECTION, SOLUTION INTRAMUSCULAR; INTRAVENOUS at 16:58

## 2020-01-10 RX ADMIN — DEXAMETHASONE SODIUM PHOSPHATE 8 MG: 4 INJECTION, SOLUTION INTRAMUSCULAR; INTRAVENOUS at 14:45

## 2020-01-10 RX ADMIN — SODIUM CHLORIDE, SODIUM LACTATE, POTASSIUM CHLORIDE, AND CALCIUM CHLORIDE 75 ML/HR: 600; 310; 30; 20 INJECTION, SOLUTION INTRAVENOUS at 23:49

## 2020-01-10 RX ADMIN — ROCURONIUM BROMIDE 5 MG: 10 SOLUTION INTRAVENOUS at 14:25

## 2020-01-10 RX ADMIN — DEXMEDETOMIDINE HYDROCHLORIDE 12 MCG: 100 INJECTION, SOLUTION, CONCENTRATE INTRAVENOUS at 15:08

## 2020-01-10 RX ADMIN — ENOXAPARIN SODIUM 40 MG: 40 INJECTION SUBCUTANEOUS at 22:49

## 2020-01-10 RX ADMIN — SODIUM CHLORIDE, POTASSIUM CHLORIDE, SODIUM LACTATE AND CALCIUM CHLORIDE: 600; 310; 30; 20 INJECTION, SOLUTION INTRAVENOUS at 16:22

## 2020-01-10 RX ADMIN — FENTANYL CITRATE 25 MCG: 50 INJECTION, SOLUTION INTRAMUSCULAR; INTRAVENOUS at 18:00

## 2020-01-10 RX ADMIN — MAGNESIUM SULFATE 2 G: 2 INJECTION INTRAVENOUS at 14:35

## 2020-01-10 RX ADMIN — FENTANYL CITRATE 100 MCG: 50 INJECTION, SOLUTION INTRAMUSCULAR; INTRAVENOUS at 14:25

## 2020-01-10 RX ADMIN — MIDAZOLAM 2 MG: 1 INJECTION INTRAMUSCULAR; INTRAVENOUS at 14:18

## 2020-01-10 RX ADMIN — Medication 3 MG: at 16:58

## 2020-01-10 RX ADMIN — LIDOCAINE HYDROCHLORIDE 80 MG: 20 INJECTION, SOLUTION EPIDURAL; INFILTRATION; INTRACAUDAL; PERINEURAL at 14:25

## 2020-01-10 RX ADMIN — PROPOFOL 100 MG: 10 INJECTION, EMULSION INTRAVENOUS at 14:25

## 2020-01-10 RX ADMIN — SODIUM CHLORIDE, POTASSIUM CHLORIDE, SODIUM LACTATE AND CALCIUM CHLORIDE: 600; 310; 30; 20 INJECTION, SOLUTION INTRAVENOUS at 14:20

## 2020-01-10 RX ADMIN — ROCURONIUM BROMIDE 20 MG: 10 SOLUTION INTRAVENOUS at 16:08

## 2020-01-10 RX ADMIN — ONDANSETRON HYDROCHLORIDE 4 MG: 2 INJECTION, SOLUTION INTRAMUSCULAR; INTRAVENOUS at 16:54

## 2020-01-10 RX ADMIN — ATORVASTATIN CALCIUM 20 MG: 40 TABLET, FILM COATED ORAL at 22:48

## 2020-01-10 RX ADMIN — SUCCINYLCHOLINE CHLORIDE 160 MG: 20 INJECTION, SOLUTION INTRAMUSCULAR; INTRAVENOUS at 14:25

## 2020-01-10 RX ADMIN — LIDOCAINE HYDROCHLORIDE 2 MG/KG/HR: 8 INJECTION, SOLUTION INTRAVENOUS at 14:47

## 2020-01-10 RX ADMIN — ALVIMOPAN 12 MG: 12 CAPSULE ORAL at 13:00

## 2020-01-10 RX ADMIN — Medication 80 MCG: at 14:25

## 2020-01-10 RX ADMIN — ACETAMINOPHEN 1000 MG: 500 TABLET ORAL at 22:48

## 2020-01-10 RX ADMIN — KETAMINE HYDROCHLORIDE 30 MG: 100 INJECTION INTRAMUSCULAR; INTRAVENOUS at 14:50

## 2020-01-10 RX ADMIN — CELECOXIB 200 MG: 200 CAPSULE ORAL at 13:01

## 2020-01-10 NOTE — BRIEF OP NOTE
BRIEF OPERATIVE NOTE    Date of Procedure: 1/10/2020   Preoperative Diagnosis: CECAL POLYP  Postoperative Diagnosis: CECAL POLYP    Procedure(s):  LAPAROSCOPIC ILEOCECECTOMY  Surgeon(s) and Role:     * Juan Conway MD - Primary         Surgical Assistant: JERRY Holder    Surgical Staff:  Circ-1: Sri Cobb  Circ-Relief: Jayson Bloom RN  Scrub Tech-1: Suki Adler RN-Relief: Alejandro Le Staff: hSani Silveira RN  Event Time In Time Out   Incision Start 1454    Incision Close 1658      Anesthesia: General   Estimated Blood Loss: 50 mL  Specimens:   ID Type Source Tests Collected by Time Destination   1 : ILEOCECECTOMY Fresh Abdomen  Pippa Boudreaux MD 1/10/2020 1557 Pathology      Findings: No evidence of invasive malignancy or metastatic disease noted.      Complications: None  Implants: * No implants in log *

## 2020-01-10 NOTE — PERIOP NOTES
TRANSFER - OUT REPORT:    Verbal report given to [address] on Petra Brand  being transferred to [facility](unit) for routine post - op       Report consisted of patients Situation, Background, Assessment and   RecoICOmmendations(SBAR). Time Pre op antibiotic given:CEFOTETAN 2 G @1441  Anesthesia Stop time: 3594  Ball Present on Transfer to floor:YES  Order for Ball on Chart:YES  Discharge Prescriptions with Chart:YES (Lionel Lou)    Information from the following report(s) SBAR, OR Summary, Intake/Output, MAR, Recent Results, Cardiac Rhythm NSR and Procedure Verification was reviewed with the receiving nurse. Opportunity for questions and clarification was provided. Is the patient on 02? YES       L/Min 2           Is the patient on a monitor? NO    Is the nurse transporting with the patient? NO    Surgical Waiting Area notified of patient's transfer from PACU? YES (CALLED DL BILLINGS -[phone] WITH UPDATE)      The following personal items collected during your admission accompanied patient upon transfer:   Dental Appliance: Dental Appliances: None  Vision:    Hearing Aid:    Jewelry: Jewelry: None  Clothing: Clothing: (clolthes [facility] acu)  Other Valuables:  Other Valuables: None  Valuables sent to safe:

## 2020-01-10 NOTE — H&P
[facility] History and Physical    History of Present Illness:      Hosea Brand is a 70 y.o. male who is known to me for an adenomatous, sessile polyp of the appendiceal orifice. Please see my prior note for full details. He presents today for laparoscopic ileocecectomy. He has been doing well. No changes reported. He tolerated his pre-op bowel prep well. Past Medical History:   Diagnosis Date    Acid indigestion     Arthritis 8/20/2012    Back pain     CAD (coronary artery disease)     Chronic pain     BACK    Diabetes ([facility] [address]     BORDERLINE    GERD (gastroesophageal reflux disease) 8/20/2012    HTN (hypertension) 8/20/2012    Hyperlipemia 8/20/2012    IH (inguinal hernia) 2/22/2015 2015, small right , reducible IH .      Ill-defined condition 12/2018    TIA, double vision    Insomnia     Stroke ([facility] [address] 12/2018    TIA     Thyroid disorder 8/20/2012       Past Surgical History:   Procedure Laterality Date    CARDIAC SURG PROCEDURE UNLIST  2005    CABG-3 VESSEL    COLONOSCOPY N/A 10/7/2019    COLONOSCOPY performed by Emily Francisco MD at [address] [place] [address] ENDOSCOPY    HX CORONARY ARTERY BYPASS GRAFT  2006    HX HEENT      NASAL SURGERY    HX THYROIDECTOMY           Current Facility-Administered Medications:     lactated Ringers infusion, 75 mL/hr, IntraVENous, CONTINUOUS, Eli Conway MD    cefoTEtan (CEFOTAN) 2 g in 0.9% sodium chloride (MBP/ADV) 50 mL, 2 g, IntraVENous, ONCE, Eli Conway MD    midazolam (VERSED) injection 1 mg, 1 mg, IntraVENous, PRN, Arcelia Bell MD    No Known Allergies    Social History     Socioeconomic History    Marital status:      Spouse name: Not on file    Number of children: Not on file    Years of education: Not on file    Highest education level: Not on file   Occupational History    Not on file   Social Needs    Financial resource strain: Not on file    Food insecurity:     Worry: Not on file Inability: Not on file    Transportation needs:     Medical: Not on file     Non-medical: Not on file   Tobacco Use    Smoking status: Former Smoker     Packs/day: 1.00     Years: 10.00     Pack years: 10.00     Types: Cigarettes     Last attempt to quit: 1980     Years since quittin.0    Smokeless tobacco: Never Used   Substance and Sexual Activity    Alcohol use: Not on file     Comment: 1-2 DRINKS A YEAR    Drug use: Never    Sexual activity: Yes   Lifestyle    Physical activity:     Days per week: Not on file     Minutes per session: Not on file    Stress: Not on file   Relationships    Social connections:     Talks on phone: Not on file     Gets together: Not on file     Attends Mandaeism service: Not on file     Active member of club or organization: Not on file     Attends meetings of clubs or organizations: Not on file     Relationship status: Not on file    Intimate partner violence:     Fear of current or ex partner: Not on file     Emotionally abused: Not on file     Physically abused: Not on file     Forced sexual activity: Not on file   Other Topics Concern    Not on file   Social History Narrative    Not on file       Family History   Problem Relation Age of Onset    Cancer Father         stomach, liver WITH METS    Cancer Maternal Uncle         prostate    Cancer Paternal Aunt     Cancer Paternal Uncle     Cancer Mother         lung ca    Diabetes Mother     Heart Disease Neg Hx     Anesth Problems Neg Hx        ROS   Negative except as in HPI. Physical Exam:     Visit Vitals  /75   Pulse 89   Temp 98.5 °F (36.9 °C)   Resp 18   Ht 5' 6\" (1.676 m)   Wt 177 lb 7.5 oz (80.5 kg)   SpO2 97%   BMI 28.64 kg/m²       General - alert and oriented, no apparent distress  HEENT - NC/AT.   No scleral icterus  Pulm - CTAB, normal inspiratory effort  CV - RRR, no M/R/G  Abd - soft, NT, ND  Ext - warm, well perfused, no edema  Skin - supple, no rashes  Psychiatric - normal affect, good mood      Assessment:     Petra Brand is a 70 y.o. male with an adenomatous polyp of the appendiceal orifice not amenable to endoscopic removal.      Recommendations:     1. Plan for laparoscopic ileocecectomy. A complete discussion of the risks, benefits and alternatives to surgery were discussed with the patient who was keen to proceed.        Nikolas Perdue MD  1/10/2020  1:49 PM

## 2020-01-10 NOTE — DISCHARGE INSTRUCTIONS
Discharge Instructions for [facility] Colorectal Surgery Patients       1. Do not lift any objects weighing more than 15-20 pounds for 4-6  weeks. 2. Do not do any housework including vacuuming, scrubbing or yardwork for 4 weeks. 3. Do not drive for two weeks or while taking sedating medications. 4. You may walk as desired and go up and down stairs as needed. 5. You may shower. Do not take tub baths, swim or use hot tubs for 2 weeks. 6. You have dermabond on your incisions which is surgical glue. This will dissolve over time. Do not scrub around incisions until the glue comes off.    7. Follow low-fiber diet for 2 weeks. (See handbook for additional details). 8. Drink nutritional supplements 2 times per day until your diet and appetite are back to normal. Diabetic patients should drink one-half bottle 4 times daily. 9. Multiple bowel movements are normal each day. Contact your surgeons office for any concerns. 10. Take acetaminophen (Tylenol) as needed for mild to moderate pain and take Oxycodone per prescription instructions for severe pain. 11. Follow up with providers as scheduled. 12. If your surgery involves an ostomy bag, please bring your supplies to the first office visit with your surgeon. 13. If you experience fever (greater than 100.5), chills, vomiting or redness or drainage at surgical site, please contact your surgeons office. 14. For questions regarding quality or quantity of ostomy output, refer to ERAS handbook or call surgeons office. Please see handbook for additional instructions. If you have further questions, please call your surgeons office.       Patient Discharge Instructions    Julieta Robles / [id number] : 1945    Admitted 1/10/2020 Discharged: 1/10/2020       If you experience any of the following symptoms Fevers, Chills, Nausea, Vomitting, Redness or Drainage at Surgical Site(s) or Any Other Questions or Concerns Please Call - [phone]. Follow-up with Dr. Kofi Melendrez in ~ 14 days. Information obtained by :  I understand that if any problems occur once I am at home I am to contact my physician. I understand and acknowledge receipt of the instructions indicated above.                                                                                                                                            Physician's or R.N.'s Signature                                                                  Date/Time                                                                                                                                              Patient or Representative Signature                                                          Date/Time

## 2020-01-10 NOTE — ANESTHESIA PREPROCEDURE EVALUATION
Relevant Problems   No relevant active problems       Anesthetic History   No history of anesthetic complications            Review of Systems / Medical History  Patient summary reviewed, nursing notes reviewed and pertinent labs reviewed    Pulmonary  Within defined limits                 Neuro/Psych         Neuromuscular disease and TIA     Cardiovascular    Hypertension: well controlled                   GI/Hepatic/Renal  Within defined limits              Endo/Other    Diabetes: well controlled, type 2  Hypothyroidism  Arthritis     Other Findings              Physical Exam    Airway  Mallampati: II  TM Distance: > 6 cm  Neck ROM: normal range of motion   Mouth opening: Normal     Cardiovascular  Regular rate and rhythm,  S1 and S2 normal,  no murmur, click, rub, or gallop             Dental  No notable dental hx       Pulmonary  Breath sounds clear to auscultation               Abdominal  GI exam deferred       Other Findings            Anesthetic Plan    ASA: 2  Anesthesia type: general          Induction: Intravenous  Anesthetic plan and risks discussed with: Patient

## 2020-01-11 LAB
ANION GAP SERPL CALC-SCNC: 7 MMOL/L (ref 5–15)
BUN SERPL-MCNC: 11 MG/DL (ref 6–20)
BUN/CREAT SERPL: 11 (ref 12–20)
CALCIUM SERPL-MCNC: 9.2 MG/DL (ref 8.5–10.1)
CHLORIDE SERPL-SCNC: 103 MMOL/L (ref 97–108)
CO2 SERPL-SCNC: 27 MMOL/L (ref 21–32)
CREAT SERPL-MCNC: 1.02 MG/DL (ref 0.7–1.3)
ERYTHROCYTE [DISTWIDTH] IN BLOOD BY AUTOMATED COUNT: 12.7 % (ref 11.5–14.5)
GLUCOSE SERPL-MCNC: 177 MG/DL (ref 65–100)
HCT VFR BLD AUTO: 43.2 % (ref 36.6–50.3)
HGB BLD-MCNC: 15 G/DL (ref 12.1–17)
MAGNESIUM SERPL-MCNC: 2.1 MG/DL (ref 1.6–2.4)
MCH RBC QN AUTO: 31.2 PG (ref 26–34)
MCHC RBC AUTO-ENTMCNC: 34.7 G/DL (ref 30–36.5)
MCV RBC AUTO: 89.8 FL (ref 80–99)
NRBC # BLD: 0 K/UL (ref 0–0.01)
NRBC BLD-RTO: 0 PER 100 WBC
PHOSPHATE SERPL-MCNC: 3.1 MG/DL (ref 2.6–4.7)
PLATELET # BLD AUTO: 153 K/UL (ref 150–400)
PMV BLD AUTO: 9.2 FL (ref 8.9–12.9)
POTASSIUM SERPL-SCNC: 3.7 MMOL/L (ref 3.5–5.1)
RBC # BLD AUTO: 4.81 M/UL (ref 4.1–5.7)
SODIUM SERPL-SCNC: 137 MMOL/L (ref 136–145)
WBC # BLD AUTO: 15.6 K/UL (ref 4.1–11.1)

## 2020-01-11 PROCEDURE — 74011250637 HC RX REV CODE- 250/637: Performed by: SURGERY

## 2020-01-11 PROCEDURE — 85027 COMPLETE CBC AUTOMATED: CPT

## 2020-01-11 PROCEDURE — 80048 BASIC METABOLIC PNL TOTAL CA: CPT

## 2020-01-11 PROCEDURE — 65270000029 HC RM PRIVATE

## 2020-01-11 PROCEDURE — 36415 COLL VENOUS BLD VENIPUNCTURE: CPT

## 2020-01-11 PROCEDURE — 83735 ASSAY OF MAGNESIUM: CPT

## 2020-01-11 PROCEDURE — 84100 ASSAY OF PHOSPHORUS: CPT

## 2020-01-11 PROCEDURE — 74011250636 HC RX REV CODE- 250/636: Performed by: SURGERY

## 2020-01-11 RX ORDER — ENOXAPARIN SODIUM 100 MG/ML
INJECTION SUBCUTANEOUS
Status: DISPENSED
Start: 2020-01-11 | End: 2020-01-12

## 2020-01-11 RX ORDER — KETOROLAC TROMETHAMINE 30 MG/ML
INJECTION, SOLUTION INTRAMUSCULAR; INTRAVENOUS
Status: DISPENSED
Start: 2020-01-11 | End: 2020-01-12

## 2020-01-11 RX ORDER — ACETAMINOPHEN 500 MG
TABLET ORAL
Status: DISPENSED
Start: 2020-01-11 | End: 2020-01-11

## 2020-01-11 RX ORDER — CLONIDINE HYDROCHLORIDE 0.1 MG/1
TABLET ORAL
Status: DISPENSED
Start: 2020-01-11 | End: 2020-01-11

## 2020-01-11 RX ORDER — CLONIDINE HYDROCHLORIDE 0.1 MG/1
0.2 TABLET ORAL
Status: DISCONTINUED | OUTPATIENT
Start: 2020-01-11 | End: 2020-01-12 | Stop reason: HOSPADM

## 2020-01-11 RX ORDER — KETOROLAC TROMETHAMINE 30 MG/ML
INJECTION, SOLUTION INTRAMUSCULAR; INTRAVENOUS
Status: DISPENSED
Start: 2020-01-11 | End: 2020-01-11

## 2020-01-11 RX ORDER — PANTOPRAZOLE SODIUM 40 MG/1
TABLET, DELAYED RELEASE ORAL
Status: DISPENSED
Start: 2020-01-11 | End: 2020-01-11

## 2020-01-11 RX ORDER — LEVOTHYROXINE SODIUM 125 UG/1
TABLET ORAL
Status: DISPENSED
Start: 2020-01-11 | End: 2020-01-11

## 2020-01-11 RX ORDER — ACETAMINOPHEN 500 MG
TABLET ORAL
Status: DISPENSED
Start: 2020-01-11 | End: 2020-01-12

## 2020-01-11 RX ORDER — ALVIMOPAN 12 MG/1
CAPSULE ORAL
Status: DISPENSED
Start: 2020-01-11 | End: 2020-01-12

## 2020-01-11 RX ORDER — ALVIMOPAN 12 MG/1
CAPSULE ORAL
Status: DISPENSED
Start: 2020-01-11 | End: 2020-01-11

## 2020-01-11 RX ORDER — TAMSULOSIN HYDROCHLORIDE 0.4 MG/1
CAPSULE ORAL
Status: DISPENSED
Start: 2020-01-11 | End: 2020-01-11

## 2020-01-11 RX ORDER — THERA TABS 400 MCG
TAB ORAL
Status: DISPENSED
Start: 2020-01-11 | End: 2020-01-11

## 2020-01-11 RX ORDER — CELECOXIB 100 MG/1
CAPSULE ORAL
Status: DISPENSED
Start: 2020-01-11 | End: 2020-01-12

## 2020-01-11 RX ORDER — LIDOCAINE HYDROCHLORIDE ANHYDROUS AND DEXTROSE MONOHYDRATE .8; 5 G/100ML; G/100ML
INJECTION, SOLUTION INTRAVENOUS
Status: DISPENSED
Start: 2020-01-11 | End: 2020-01-12

## 2020-01-11 RX ORDER — OXYCODONE HYDROCHLORIDE 5 MG/1
TABLET ORAL
Status: DISPENSED
Start: 2020-01-11 | End: 2020-01-11

## 2020-01-11 RX ORDER — ATORVASTATIN CALCIUM 20 MG/1
TABLET, FILM COATED ORAL
Status: DISPENSED
Start: 2020-01-11 | End: 2020-01-12

## 2020-01-11 RX ADMIN — OXYCODONE 5 MG: 5 TABLET ORAL at 00:51

## 2020-01-11 RX ADMIN — ALVIMOPAN 12 MG: 12 CAPSULE ORAL at 17:37

## 2020-01-11 RX ADMIN — ACETAMINOPHEN 1000 MG: 500 TABLET ORAL at 17:37

## 2020-01-11 RX ADMIN — ATORVASTATIN CALCIUM 20 MG: 40 TABLET, FILM COATED ORAL at 22:39

## 2020-01-11 RX ADMIN — PANTOPRAZOLE SODIUM 40 MG: 40 TABLET, DELAYED RELEASE ORAL at 09:48

## 2020-01-11 RX ADMIN — SODIUM CHLORIDE, SODIUM LACTATE, POTASSIUM CHLORIDE, AND CALCIUM CHLORIDE 75 ML/HR: 600; 310; 30; 20 INJECTION, SOLUTION INTRAVENOUS at 14:30

## 2020-01-11 RX ADMIN — KETOROLAC TROMETHAMINE 15 MG: 30 INJECTION, SOLUTION INTRAMUSCULAR at 17:37

## 2020-01-11 RX ADMIN — LIDOCAINE HYDROCHLORIDE 1 MG/KG/HR: 8 INJECTION, SOLUTION INTRAVENOUS at 22:46

## 2020-01-11 RX ADMIN — TAMSULOSIN HYDROCHLORIDE 0.4 MG: 0.4 CAPSULE ORAL at 09:48

## 2020-01-11 RX ADMIN — ALVIMOPAN 12 MG: 12 CAPSULE ORAL at 09:48

## 2020-01-11 RX ADMIN — LEVOTHYROXINE SODIUM 125 MCG: 125 TABLET ORAL at 08:47

## 2020-01-11 RX ADMIN — ENOXAPARIN SODIUM 40 MG: 40 INJECTION SUBCUTANEOUS at 22:38

## 2020-01-11 RX ADMIN — ACETAMINOPHEN 1000 MG: 500 TABLET ORAL at 04:50

## 2020-01-11 RX ADMIN — KETOROLAC TROMETHAMINE 15 MG: 30 INJECTION, SOLUTION INTRAMUSCULAR at 11:28

## 2020-01-11 RX ADMIN — ACETAMINOPHEN 1000 MG: 500 TABLET ORAL at 11:28

## 2020-01-11 RX ADMIN — CELECOXIB 100 MG: 100 CAPSULE ORAL at 22:40

## 2020-01-11 RX ADMIN — KETOROLAC TROMETHAMINE 15 MG: 30 INJECTION, SOLUTION INTRAMUSCULAR at 04:52

## 2020-01-11 RX ADMIN — CLONIDINE HYDROCHLORIDE 0.2 MG: 0.1 TABLET ORAL at 05:52

## 2020-01-11 RX ADMIN — ACETAMINOPHEN 1000 MG: 500 TABLET ORAL at 23:58

## 2020-01-11 RX ADMIN — THERA TABS 1 TABLET: TAB at 09:48

## 2020-01-11 NOTE — PROGRESS NOTES
Pt BP consistently staying in the 170s, HR in the 110s. Pt states he has been having elevated SBps in 170s at home, and is intending to speak to his personal cardiologist following this admission. Per MD orders, will notify if BP becomes >180, HR>120. Pt stable, states his pain is decreasing. 0421-Latest VS are 123 HR, and 180/92 BP. MD paged concerning rising HR and elevated BP. MD acknowledged changes in VS, plans to order clonidine. Will continue to monitor and tx pain.

## 2020-01-11 NOTE — ROUTINE PROCESS
Patient received from PACU. Report given to Benny Downs at bedside when received. Patient in no apparent distress. Bedside and Verbal shift change report given to Benny Downs RN (oncoming nurse) by Katie Spivey RN (offgoing nurse). Report included the following information SBAR, Kardex, Intake/Output, MAR and Recent Results.

## 2020-01-11 NOTE — ANESTHESIA POSTPROCEDURE EVALUATION
Post-Anesthesia Evaluation and Assessment    Patient: Donna Alvarado MRN: [id number]  SSN: [id number]    YOB: 1945  Age: 70 y.o. Sex: male       Cardiovascular Function/Vital Signs  Visit Vitals  /69   Pulse 76   Temp 36.7 °C (98 °F)   Resp 20   Ht 5' 6\" (1.676 m)   Wt 80.5 kg (177 lb 7.5 oz)   SpO2 97%   BMI 28.64 kg/m²       Patient is status post General anesthesia for Procedure(s):  LAPAROSCOPIC RIGHT COLECTOMY, POSSIBLE OPEN ( E R A S). Nausea/Vomiting: None    Postoperative hydration reviewed and adequate. Pain:  Pain Scale 1: FLACC (01/10/20 1826)  Pain Intensity 1: 6 (01/10/20 1236)   Managed    Neurological Status:   Neuro (WDL): Within Defined Limits (01/10/20 1826)  Neuro  LUE Motor Response: Purposeful (01/10/20 1826)  LLE Motor Response: Purposeful (01/10/20 1826)  RUE Motor Response: Purposeful (01/10/20 1826)  RLE Motor Response: Purposeful (01/10/20 1826)   At baseline    Mental Status and Level of Consciousness: Alert and oriented to person, place, and time    Pulmonary Status:   O2 Device: Nasal cannula (01/10/20 1826)   Adequate oxygenation and airway patent    Complications related to anesthesia: None    Post-anesthesia assessment completed. No concerns    Signed By: Adan Taylor MD     January 10, 2020              Procedure(s):  LAPAROSCOPIC RIGHT COLECTOMY, POSSIBLE OPEN ( E R A S). general    <BSHSIANPOST>    Vitals Value Taken Time   /77 1/10/2020  7:00 PM   Temp 36.7 °C (98 °F) 1/10/2020  6:26 PM   Pulse 88 1/10/2020  7:01 PM   Resp 22 1/10/2020  7:01 PM   SpO2 96 % 1/10/2020  7:01 PM   Vitals shown include unvalidated device data.

## 2020-01-11 NOTE — PROGRESS NOTES
Progress Note    Patient: Yvette Padilla MRN: [id number]  SSN: [id number]    YOB: 1945  Age: 70 y.o. Sex: male      Admit Date: 1/10/2020    1 Day Post-Op    Procedure:  Procedure(s):  LAPAROSCOPIC RIGHT COLECTOMY, POSSIBLE OPEN ( E R A S)    Subjective:     No acute surgical issues. Pt doing better this am.  He felt lightheaded last night getting up. BP and HR were also elevated last night but is now better. Tolerating diet without nausea or vomiting. Pain is under control.      Objective:     Visit Vitals  /61 (BP 1 Location: Right arm, BP Patient Position: At rest)   Pulse 96   Temp 98 °F (36.7 °C)   Resp 16   Ht 5' 6\" (1.676 m)   Wt 175 lb 4.8 oz (79.5 kg)   SpO2 93%   BMI 28.29 kg/m²       Temp (24hrs), Av °F (36.7 °C), Min:97.5 °F (36.4 °C), Max:98.5 °F (36.9 °C)        Physical Exam:    Gen:  NAD  Pulm:  Unlabored  Abd:  S/mildly distended/appropriate TTP  Wound:  C/D/I    Recent Results (from the past 24 hour(s))   CBC W/O DIFF    Collection Time: 20 12:55 AM   Result Value Ref Range    WBC 15.6 (H) 4.1 - 11.1 K/uL    RBC 4.81 4.10 - 5.70 M/uL    HGB 15.0 12.1 - 17.0 g/dL    HCT 43.2 36.6 - 50.3 %    MCV 89.8 80.0 - 99.0 FL    MCH 31.2 26.0 - 34.0 PG    MCHC 34.7 30.0 - 36.5 g/dL    RDW 12.7 11.5 - 14.5 %    PLATELET 520 002 - 167 K/uL    MPV 9.2 8.9 - 12.9 FL    NRBC 0.0 0  WBC    ABSOLUTE NRBC 0.00 0.00 - 9.66 K/uL   METABOLIC PANEL, BASIC    Collection Time: 20 12:55 AM   Result Value Ref Range    Sodium 137 136 - 145 mmol/L    Potassium 3.7 3.5 - 5.1 mmol/L    Chloride 103 97 - 108 mmol/L    CO2 27 21 - 32 mmol/L    Anion gap 7 5 - 15 mmol/L    Glucose 177 (H) 65 - 100 mg/dL    BUN 11 6 - 20 MG/DL    Creatinine 1.02 0.70 - 1.30 MG/DL    BUN/Creatinine ratio 11 (L) 12 - 20      GFR est AA >60 >60 ml/min/1.73m2    GFR est non-AA >60 >60 ml/min/1.73m2    Calcium 9.2 8.5 - 10.1 MG/DL   MAGNESIUM    Collection Time: 20 12:55 AM   Result Value Ref Range Magnesium 2.1 1.6 - 2.4 mg/dL   PHOSPHORUS    Collection Time: 01/11/20 12:55 AM   Result Value Ref Range    Phosphorus 3.1 2.6 - 4.7 MG/DL         Assessment:     Hospital Problems  Date Reviewed: 1/10/2020          Codes Class Noted POA    * (Principal) Adenomatous polyp of cecum ICD-10-CM: D12.0  ICD-9-CM: 211.3  1/10/2020 Unknown              Plan/Recommendations/Medical Decision Making:     - Diet as tolerated  - ERAS protocol  - Possible DC home tomorrow  - HTN:  Clonidine prn

## 2020-01-11 NOTE — OP NOTES
[address]   OPERATIVE REPORT    Name:  Romana Espinoza  MR#:  [id number]  :  1945  ACCOUNT #:  [de-identified]  DATE OF SERVICE:  01/10/2020      PREOPERATIVE DIAGNOSIS:  Adenomatous polyp of appendiceal orifice. POSTOPERATIVE DIAGNOSIS:  Adenomatous polyp of appendiceal orifice. PROCEDURE PERFORMED:  Laparoscopic ileocecectomy. SURGEON:  Phil Agudelo. Alfredo Wei MD    ASSISTANT:  JERRY Ramirez. Audra Pepper's assistance was required secondary to the complex nature of this laparoscopic operation. She assisted throughout with operation of the camera and exposure. ANESTHESIA:  General.    COMPLICATIONS:  None. SPECIMENS REMOVED:  Ileocecectomy. Disposition of specimen is to Pathology. IMPLANTS:  None. ESTIMATED BLOOD LOSS:  50 mL. DISPOSITION:  To PACU. FINDINGS:  The patient had no evidence of invasive malignancy or metastatic disease noted. INDICATIONS:  The patient is a 65-year-old gentleman who on colonoscopy had a nodular area identified at the appendiceal orifice. This was biopsied and consistent with a sessile adenomatous polyp. This was not amenable to endoscopic resection, and thus he was referred for surgical resection of the polyp. Recommendation was made for ileocecectomy. Complete discussion of risks, benefits, and alternatives of surgery were had with the patient, and he was in agreement to proceed. I had recommended a laparoscopic ileocecectomy and he was in agreement with this. Informed consent was obtained. DESCRIPTION OF OPERATION:  After informed consent was obtained, the patient was brought back to the operating room. He was placed under general endotracheal anesthesia in the supine position on the operating room table. His left arm was tucked. A Ball catheter was placed. He was then prepped and draped in the usual sterile fashion. A proper time-out was performed.   With this completed, we injected local anesthetic into the skin and subcutaneous tissue just above the umbilicus. A 5-mm vertical incision was made. We dissected down to the base of the umbilicus using a Kocher clamp. This was grasped and retracted anteriorly. A Veress needle was then passed into the abdomen and a standard water drop technique test was performed. The abdomen was insufflated to 15 mmHg. The Veress needle was then removed and an Optiview trocar was placed through this incision with the scope inserted. The abdomen was entered safely. Cursory evaluation of the abdomen did not identify any evidence of malignancy or metastatic disease. We then placed additional trocars including a left lower quadrant 12-mm trocar, a 5-mm suprapubic trocar, and an additional 5-mm midline epigastric trocar. These were all placed after injection of local anesthetic and under direct visualization. With this completed, we then began by identifying the cecum and this was grasped and retracted anteriorly. There was no evidence of obvious mass or abnormality to this area. We cleared the ileocecal pedicle and dissected around the base of this circumferentially. This was then divided using a 60 mm white Endo DUNCAN staple load. We then began dividing the mesentery in a medial to lateral fashion dissecting along the anterior surface of Gerota's fascia and extending our dissection up to the area of the mid to upper ascending colon. Similarly, we dissected down along the mesentery to the terminal ileum, taking approximately 8 cm of the terminal ileum. Once the medial dissection was completed, we then took down lateral attachments including some inflammatory appearing adhesions as well as the white line of Toldt. This was done all the way up to the level of the hepatic flexure in order to medialize and mobilize the colon for future anastomosis. We then took down the gastrocolic ligament to further mobilize the area of the hepatic flexure.   This was done preserving the blood supply to this area. Once this was nicely mobilized and we completed our mobilization, we then used an 0 Vicryl suture on a suture passer to close the left lower quadrant 12 mm trocar site and a grasper was placed on the distal terminal ileum. We then made an extraction incision by connecting the two supraumbilical trocars using a 15-blade scalpel to open the skin followed by electrocautery to dissect through the midline fascia. A wound protector was put in place and the specimen was externalized. This was palpated and there was not an obvious malignant lesion identified. We divided the ascending colon in the mid-to-upper ascending colon area at our planned area of transection using a blue 60-mm Endo DUNCAN staple load. An additional 60 mm white load was used to complete the transection. We then divided the terminal ileum at our planned area of transection using a 60-mm white Endo DUNCAN staple load. The specimen was passed off the field as ileocecectomy. I then aligned the divided aspect of the colon and the distal terminal ileum in an antiperistaltic fashion along the antimesenteric borders using interrupted 3-0 silk sutures. We then made a side-to-side functional end-to-end anastomosis using a 60-mm white Endo DUNCAN staple load. The common enterotomy was closed using a running 4-0 PDS suture followed by interrupted 3-0 silk sutures. The anastomosis was palpated and noted to be widely patent. The anastomosis was under no tension and good hemostasis was achieved. The staple line was protected with 3-0 silk sutures on both the proximal and distal crotch line area. This was then  dropped back into the abdomen in a non-obstructing gentle cascading fashion and covered with omentum. The abdomen was inspected through the extraction site cavity and no evidence of bleeding or other contamination was identified.   The wound protector was removed and additional local anesthetic was injected at the level of the fascia at the site. We then removed the remaining trocars. We changed gown and gloves and opened the closing tray instruments and new drapery was laid. The extraction site incision was closed at the level of the fascia using a #1 PDS suture in running fashion from above and below. This wound was then copiously irrigated. This was closed in additional layers including 3-0 Vicryl interrupted suture to reapproximate the deep dermis and subcutaneous tissue followed by a 4-0 Monocryl subcuticular stitch to reapproximate the skin. The remaining two laparoscopic trocar sites were closed in the skin using a 4-0 Monocryl subcuticular stitch. Dermabond skin adhesive was placed over all incisions. The patient was then awakened, extubated, and taken to the postanesthesia care unit in stable condition. All needle and instrument counts were correct at the completion of the case. I was present and scrubbed throughout the entirety of the case. There were no immediate complications.         James Kaur MD      [id number]  D:  01/10/2020 17:43  T:  01/11/2020 3:45  JOB #:  [id number]

## 2020-01-11 NOTE — PROGRESS NOTES
Bedside shift change report given to SHANAE Echeverria (oncoming nurse) by SHANAE Kellogg (offgoing nurse). Report included the following information SBAR, Kardex, Intake/Output, MAR and Recent Results.

## 2020-01-12 VITALS
OXYGEN SATURATION: 96 % | RESPIRATION RATE: 18 BRPM | HEIGHT: 66 IN | BODY MASS INDEX: 28.82 KG/M2 | DIASTOLIC BLOOD PRESSURE: 76 MMHG | WEIGHT: 179.3 LBS | SYSTOLIC BLOOD PRESSURE: 131 MMHG | TEMPERATURE: 97.5 F | HEART RATE: 91 BPM

## 2020-01-12 LAB
ANION GAP SERPL CALC-SCNC: 7 MMOL/L (ref 5–15)
BUN SERPL-MCNC: 20 MG/DL (ref 6–20)
BUN/CREAT SERPL: 25 (ref 12–20)
CALCIUM SERPL-MCNC: 8.5 MG/DL (ref 8.5–10.1)
CHLORIDE SERPL-SCNC: 108 MMOL/L (ref 97–108)
CO2 SERPL-SCNC: 25 MMOL/L (ref 21–32)
CREAT SERPL-MCNC: 0.81 MG/DL (ref 0.7–1.3)
ERYTHROCYTE [DISTWIDTH] IN BLOOD BY AUTOMATED COUNT: 13.1 % (ref 11.5–14.5)
GLUCOSE SERPL-MCNC: 129 MG/DL (ref 65–100)
HCT VFR BLD AUTO: 37.4 % (ref 36.6–50.3)
HGB BLD-MCNC: 12.3 G/DL (ref 12.1–17)
MCH RBC QN AUTO: 30.4 PG (ref 26–34)
MCHC RBC AUTO-ENTMCNC: 32.9 G/DL (ref 30–36.5)
MCV RBC AUTO: 92.6 FL (ref 80–99)
NRBC # BLD: 0 K/UL (ref 0–0.01)
NRBC BLD-RTO: 0 PER 100 WBC
PLATELET # BLD AUTO: 130 K/UL (ref 150–400)
PMV BLD AUTO: 9.2 FL (ref 8.9–12.9)
POTASSIUM SERPL-SCNC: 3.5 MMOL/L (ref 3.5–5.1)
RBC # BLD AUTO: 4.04 M/UL (ref 4.1–5.7)
SODIUM SERPL-SCNC: 140 MMOL/L (ref 136–145)
WBC # BLD AUTO: 7.9 K/UL (ref 4.1–11.1)

## 2020-01-12 PROCEDURE — 36415 COLL VENOUS BLD VENIPUNCTURE: CPT

## 2020-01-12 PROCEDURE — 80048 BASIC METABOLIC PNL TOTAL CA: CPT

## 2020-01-12 PROCEDURE — 74011250637 HC RX REV CODE- 250/637: Performed by: SURGERY

## 2020-01-12 PROCEDURE — 85027 COMPLETE CBC AUTOMATED: CPT

## 2020-01-12 RX ADMIN — PANTOPRAZOLE SODIUM 40 MG: 40 TABLET, DELAYED RELEASE ORAL at 09:43

## 2020-01-12 RX ADMIN — CELECOXIB 100 MG: 100 CAPSULE ORAL at 09:42

## 2020-01-12 RX ADMIN — ALVIMOPAN 12 MG: 12 CAPSULE ORAL at 09:43

## 2020-01-12 RX ADMIN — LEVOTHYROXINE SODIUM 125 MCG: 125 TABLET ORAL at 07:46

## 2020-01-12 RX ADMIN — THERA TABS 1 TABLET: TAB at 09:44

## 2020-01-12 RX ADMIN — TAMSULOSIN HYDROCHLORIDE 0.4 MG: 0.4 CAPSULE ORAL at 09:42

## 2020-01-12 RX ADMIN — ACETAMINOPHEN 1000 MG: 500 TABLET ORAL at 12:23

## 2020-01-12 RX ADMIN — ACETAMINOPHEN 1000 MG: 500 TABLET ORAL at 07:45

## 2020-01-12 NOTE — PROGRESS NOTES
Discharge instructions given at this time with scripts. The patient had no questions at this time. Patient discharged via wheelchair by PCT to personal vehicle driven by a family friend to his home at this time.

## 2020-01-12 NOTE — PROGRESS NOTES
Problem: Falls - Risk of  Goal: *Absence of Falls  Description  Document Manolo Ornelas Fall Risk and appropriate interventions in the flowsheet.   Outcome: Progressing Towards Goal  Note: Fall Risk Interventions:            Medication Interventions: Patient to call before getting OOB, Teach patient to arise slowly    Elimination Interventions: Call light in reach

## 2020-01-12 NOTE — PROGRESS NOTES
Progress Note    Patient: Allen Casanova MRN: [id number]  SSN: [id number]    YOB: 1945  Age: 70 y.o. Sex: male      Admit Date: 1/10/2020    2 Day Post-Op    Procedure:  Procedure(s):  LAPAROSCOPIC RIGHT COLECTOMY, POSSIBLE OPEN ( E R A S)    Subjective:     No acute surgical issues. Pt is doing well. He is passing flatus and having BM but he is feeling a little bloated. Tolerating diet without nausea or vomiting. Pain is under control.      Objective:     Visit Vitals  /84   Pulse 79   Temp 98.3 °F (36.8 °C)   Resp 16   Ht 5' 6\" (1.676 m)   Wt 179 lb 4.8 oz (81.3 kg)   SpO2 95%   BMI 28.94 kg/m²       Temp (24hrs), Av.2 °F (36.8 °C), Min:98 °F (36.7 °C), Max:98.4 °F (36.9 °C)        Physical Exam:    Gen:  NAD  Pulm:  Unlabored  Abd:  S/mildly distended/appropriate TTP  Wound:  C/D/I    Recent Results (from the past 24 hour(s))   CBC W/O DIFF    Collection Time: 20  5:08 AM   Result Value Ref Range    WBC 7.9 4.1 - 11.1 K/uL    RBC 4.04 (L) 4.10 - 5.70 M/uL    HGB 12.3 12.1 - 17.0 g/dL    HCT 37.4 36.6 - 50.3 %    MCV 92.6 80.0 - 99.0 FL    MCH 30.4 26.0 - 34.0 PG    MCHC 32.9 30.0 - 36.5 g/dL    RDW 13.1 11.5 - 14.5 %    PLATELET 219 (L) 291 - 400 K/uL    MPV 9.2 8.9 - 12.9 FL    NRBC 0.0 0  WBC    ABSOLUTE NRBC 0.00 0.00 - 9.97 K/uL   METABOLIC PANEL, BASIC    Collection Time: 20  5:08 AM   Result Value Ref Range    Sodium 140 136 - 145 mmol/L    Potassium 3.5 3.5 - 5.1 mmol/L    Chloride 108 97 - 108 mmol/L    CO2 25 21 - 32 mmol/L    Anion gap 7 5 - 15 mmol/L    Glucose 129 (H) 65 - 100 mg/dL    BUN 20 6 - 20 MG/DL    Creatinine 0.81 0.70 - 1.30 MG/DL    BUN/Creatinine ratio 25 (H) 12 - 20      GFR est AA >60 >60 ml/min/1.73m2    GFR est non-AA >60 >60 ml/min/1.73m2    Calcium 8.5 8.5 - 10.1 MG/DL         Assessment:     Hospital Problems  Date Reviewed: 1/10/2020          Codes Class Noted POA    * (Principal) Adenomatous polyp of cecum ICD-10-CM: D12.0  ICD-9-CM: 211.3  1/10/2020 Unknown              Plan/Recommendations/Medical Decision Making:     - Diet as tolerated  - ERAS protocol  - plan DC home today  - HTN:  Clonidine prn

## 2020-01-12 NOTE — ROUTINE PROCESS
Bedside shift change report given to [address] (oncoming nurse) by [facility] RN (offgoing nurse). Report included the following information SBAR, Kardex, Intake/Output, MAR and Recent Results.

## 2020-01-15 NOTE — DISCHARGE SUMMARY
Physician Discharge Summary     Patient ID:  Allen Casanova  [id number]  18 y.o.  1945    Admit Date: 1/10/2020    Discharge Date: 1/12/2020    Admission Diagnoses: Adenomatous polyp of cecum [D12.0]    Discharge Diagnoses:  Principal Problem:    Adenomatous polyp of cecum (1/10/2020)         Admission Condition: Good    Discharge Condition: Good    Procedure(s):    1/10/2020 - Procedure(s):  LAPAROSCOPIC RIGHT COLECTOMY, POSSIBLE OPEN ( E R A S)      Hospital Course:   Normal hospital course for this procedure. He had an uneventful operation and post-operative course. He was discharged home on POD#2 once tolerating a diet, having adequate bowel function and with good pain control on oral medications. Consults: None    Significant Diagnostic Studies: None    Disposition: home    Patient Instructions:   Cannot display discharge medications since this patient is not currently admitted.       Activity: See surgical instructions  Diet: low residue diet x 2 weeks  Wound Care: None needed    Follow-up with John Desouza MD in 2 week(s)  Follow-up tests/labs - None    Signed:  John Desouza MD  1/15/2020  6:48 PM

## 2020-01-21 ENCOUNTER — TELEPHONE (OUTPATIENT)
Dept: SURGERY | Age: 75
End: 2020-01-21

## 2020-01-21 DIAGNOSIS — K62.5 BLOOD PER RECTUM: Primary | ICD-10-CM

## 2020-01-21 NOTE — TELEPHONE ENCOUNTER
Returned call to patient. Two patient identifiers used. Returned call to patient and explained to him per Dr. Neto Danielle he wants him to stop the Plavix. Per patient he didn't take it this morning when he saw right red blood. Patient made aware that Dr. Neto Danielle has put in a lab order for a CBC and would like to see him in the office on Thursday @ 1pm. Patient further understands that if he experiences dizziness, SOB or worsening symptoms that he is to head to the ED. Patient expressed understanding and agreement.

## 2020-01-21 NOTE — TELEPHONE ENCOUNTER
Returned call to patient. Two patient identifiers used. Patient stated he had had diarrhea x4 today and has seen bright red to brown and red blood in the stool. Patient stated the stool has \"a really bad odor\". Temp 98.2. Denies dizziness, SOB, pain, n/v, erythema, or drainage. Explained to patient I would speak to Dr. Abner Melendez and return the phone call. Patient was in agreement.

## 2020-01-23 ENCOUNTER — OFFICE VISIT (OUTPATIENT)
Dept: SURGERY | Age: 75
End: 2020-01-23

## 2020-01-23 VITALS
HEART RATE: 120 BPM | BODY MASS INDEX: 28.45 KG/M2 | DIASTOLIC BLOOD PRESSURE: 70 MMHG | SYSTOLIC BLOOD PRESSURE: 124 MMHG | TEMPERATURE: 98.8 F | RESPIRATION RATE: 19 BRPM | WEIGHT: 177 LBS | HEIGHT: 66 IN | OXYGEN SATURATION: 98 %

## 2020-01-23 DIAGNOSIS — D12.2 ADENOMATOUS POLYP OF ASCENDING COLON: ICD-10-CM

## 2020-01-23 DIAGNOSIS — K92.2 GASTROINTESTINAL HEMORRHAGE, UNSPECIFIED GASTROINTESTINAL HEMORRHAGE TYPE: Primary | ICD-10-CM

## 2020-01-23 LAB
ERYTHROCYTE [DISTWIDTH] IN BLOOD BY AUTOMATED COUNT: 12.9 % (ref 11.6–15.4)
HCT VFR BLD AUTO: 31.5 % (ref 37.5–51)
HGB BLD-MCNC: 10.5 G/DL (ref 13–17.7)
MCH RBC QN AUTO: 30.8 PG (ref 26.6–33)
MCHC RBC AUTO-ENTMCNC: 33.3 G/DL (ref 31.5–35.7)
MCV RBC AUTO: 92 FL (ref 79–97)
PLATELET # BLD AUTO: 307 X10E3/UL (ref 150–450)
RBC # BLD AUTO: 3.41 X10E6/UL (ref 4.14–5.8)
WBC # BLD AUTO: 10.1 X10E3/UL (ref 3.4–10.8)

## 2020-01-23 NOTE — PROGRESS NOTES
1. Have you been to the ER, urgent care clinic since your last visit? Hospitalized since your last visit? No    2. Have you seen or consulted any other health care providers outside of the [address] [place] [address] since your last visit? Include any pap smears or colon screening. No    complains of nausea w/o vomiting, dizziness, and decreased appetite. Patient stated he drinks Ensure Enlive.

## 2020-01-23 NOTE — PROGRESS NOTES
[facility]      Clinic Note - Follow up    Subjective     Makenzie Jackson returns for scheduled follow up today. He is s/p a laparoscopic ileocecectomy for a sessile adenomatous polyp of the appendiceal orifice on 1/10/20. His pathology showed a tubular adenoma. The patient did well initially after surgery but came in today with complaints of passing some blood in his stools. He has felt a little light headed but no other symptoms. He was having some diarrhea but this has now stopped. He states he had a more formed BM this morning without any blood. He has not passed any blood since yesterday morning. He had a CBC done yesterday as an outpatient. Objective     Visit Vitals  /70 (BP 1 Location: Left arm, BP Patient Position: Sitting)   Pulse (!) 120   Temp 98.8 °F (37.1 °C) (Oral)   Resp 19   Ht 5' 6\" (1.676 m)   Wt 177 lb (80.3 kg)   SpO2 98%   BMI 28.57 kg/m²         PE  GEN - Awake, alert, communicating appropriately. NAD  Pulm - CTAB  CV - RRR  Abd - soft, NT, ND. Incisions c/d/i. No evidence of hematoma. Ext - warm, well perfused, no edema. All other systems negative unless indicated above. Labs  Lab Results   Component Value Date/Time    WBC 10.1 01/22/2020 09:41 AM    HGB (POC) 14.5 01/29/2018 02:04 PM    HGB 10.5 (L) 01/22/2020 09:41 AM    HCT (POC) 43.4 01/29/2018 02:04 PM    HCT 31.5 (L) 01/22/2020 09:41 AM    PLATELET 054 90/59/2721 09:41 AM    MCV 92 01/22/2020 09:41 AM         Assessment     Petra Brand is a 70 y. o.yr old male s/p a laparoscopic ileocecectomy for a sessile adenomatous polyp of the appendiceal orifice on 1/10/20. His pathology showed a tubular adenoma. The patient did well initially after surgery but came in today with complaints of passing some blood in his stools. Plan     He will get a repeat CBC today to assess interval change.   If he starts passing more blood, has further drop in his H/H or is more symptomatic, he will need to be admitted to the hospital.  Otherwise, I will plan to see him back in the office next week. I have asked him to hold his plavix for now. His last dose was the day before yesterday. I will plan to call him with the results of his CBC. His pathology was discussed with him today. 20 mins of time was spent with the patient of which > 50% of the time involved face-to-face counseling of the patient regarding the proposed treatment plan. Dillan Lynn MD  1/23/2020    CC:  MD Dr. Sole Stover

## 2020-01-23 NOTE — LETTER
1/23/20 Patient: Sen Amaro YOB: 1945 Date of Visit: 1/23/2020 Ayde Lew MD 
[address] [id number] VIA In Basket Dear Ayde Lew MD, Thank you for referring Mr. Petra Brand to [facility] for evaluation. My notes for this consultation are attached. If you have questions, please do not hesitate to call me. I look forward to following your patient along with you.  
 
 
Sincerely, 
 
Violet Al MD

## 2020-01-24 ENCOUNTER — TELEPHONE (OUTPATIENT)
Dept: SURGERY | Age: 75
End: 2020-01-24

## 2020-01-24 LAB
ERYTHROCYTE [DISTWIDTH] IN BLOOD BY AUTOMATED COUNT: 13 % (ref 11.6–15.4)
HCT VFR BLD AUTO: 31.9 % (ref 37.5–51)
HGB BLD-MCNC: 10.5 G/DL (ref 13–17.7)
MCH RBC QN AUTO: 31 PG (ref 26.6–33)
MCHC RBC AUTO-ENTMCNC: 32.9 G/DL (ref 31.5–35.7)
MCV RBC AUTO: 94 FL (ref 79–97)
PLATELET # BLD AUTO: 336 X10E3/UL (ref 150–450)
RBC # BLD AUTO: 3.39 X10E6/UL (ref 4.14–5.8)
WBC # BLD AUTO: 10.2 X10E3/UL (ref 3.4–10.8)

## 2020-01-24 NOTE — TELEPHONE ENCOUNTER
Two patient identifiers used. Spoke to patient per Dr. Kofi Melendrez and explained to him that his lab results were good. Patient expressed understanding.

## 2020-01-24 NOTE — TELEPHONE ENCOUNTER
----- Message from Maria Choudhary MD sent at 1/24/2020  7:27 AM EST -----  Kari Nevarez,  Could you let this patient know his labs looked good from yesterday?     Thank you,    Braeden Guzman   ----- Message -----  From: Freddie Hi Lab Results In  Sent: 1/24/2020   3:36 AM EST  To: Maria Choudhary MD

## 2020-01-28 ENCOUNTER — OFFICE VISIT (OUTPATIENT)
Dept: SURGERY | Age: 75
End: 2020-01-28

## 2020-01-28 VITALS
OXYGEN SATURATION: 98 % | HEART RATE: 104 BPM | HEIGHT: 66 IN | RESPIRATION RATE: 18 BRPM | SYSTOLIC BLOOD PRESSURE: 113 MMHG | BODY MASS INDEX: 28.54 KG/M2 | TEMPERATURE: 98.4 F | WEIGHT: 177.6 LBS | DIASTOLIC BLOOD PRESSURE: 70 MMHG

## 2020-01-28 DIAGNOSIS — D12.2 ADENOMATOUS POLYP OF ASCENDING COLON: Primary | ICD-10-CM

## 2020-01-28 DIAGNOSIS — K92.2 GASTROINTESTINAL HEMORRHAGE, UNSPECIFIED GASTROINTESTINAL HEMORRHAGE TYPE: ICD-10-CM

## 2020-01-28 NOTE — PATIENT INSTRUCTIONS
Colon Polyps: Care Instructions  Your Care Instructions    Colon polyps are growths in the colon or the rectum. The cause of most colon polyps is not known, and most people who get them do not have any problems. But a certain kind can turn into cancer. For this reason, regular testing for colon polyps is important for people as they get older. It is also important for anyone who has an increased risk for colon cancer. Polyps are usually found through routine colon cancer screening tests. Although most colon polyps are not cancerous, they are usually removed and then tested for cancer. Screening for colon cancer saves lives because the cancer can usually be cured if it is caught early. If you have a polyp that is the type that can turn into cancer, you may need more tests to examine your entire colon. The doctor will remove any other polyps that he or she finds, and you will be tested more often. Follow-up care is a key part of your treatment and safety. Be sure to make and go to all appointments, and call your doctor if you are having problems. It's also a good idea to know your test results and keep a list of the medicines you take. How can you care for yourself at home? Regular exams to look for colon polyps are the best way to prevent polyps from turning into colon cancer. These can include stool tests, sigmoidoscopy, colonoscopy, and CT colonography. Talk with your doctor about a testing schedule that is right for you. To prevent polyps  There is no home treatment that can prevent colon polyps. But these steps may help lower your risk for cancer. · Stay active. Being active can help you get to and stay at a healthy weight. Try to exercise on most days of the week. Walking is a good choice. · Eat well. Choose a variety of vegetables, fruits, legumes (such as peas and beans), fish, poultry, and whole grains. · Do not smoke.  If you need help quitting, talk to your doctor about stop-smoking programs and medicines. These can increase your chances of quitting for good. · If you drink alcohol, limit how much you drink. Limit alcohol to 2 drinks a day for men and 1 drink a day for women. When should you call for help? Call your doctor now or seek immediate medical care if:    · You have severe belly pain.     · Your stools are maroon or very bloody.    Watch closely for changes in your health, and be sure to contact your doctor if:    · You have a fever.     · You have nausea or vomiting.     · You have a change in bowel habits (new constipation or diarrhea).     · Your symptoms get worse or are not improving as expected. Where can you learn more? Go to http://mp-thad.info/. Enter [phone] [id number] in the search box to learn more about \"Colon Polyps: Care Instructions. \"  Current as of: December 19, 2018  Content Version: 12.2  © 3788-4086 [employer]. Care instructions adapted under license by [employer] (which disclaims liability or warranty for this information). If you have questions about a medical condition or this instruction, always ask your healthcare professional. [address] any warranty or liability for your use of this information.

## 2020-01-28 NOTE — PROGRESS NOTES
Subjective:      Petra Brand is a 70 y.o. male presents for postop care 2 weeks s/p laparoscopic right colectomy by Dr. Carrie Richard. Appetite is good. Eating a regular diet without difficulty. Bowel movements are regular. The patient is voiding without difficulty. The patient is not having any pain. Sera Tomi He is feeling much better. He started his plavix yesterday. Patient has an advanced directive:  Not on file    Mr. Blayne Cisse has a reminder for a \"due or due soon\" health maintenance. I have asked that he contact his primary care provider for follow-up on this health maintenance. Objective:     Visit Vitals  /70   Pulse (!) 104   Temp 98.4 °F (36.9 °C) (Oral)   Resp 18   Ht 5' 6\" (1.676 m)   Wt 177 lb 9.6 oz (80.6 kg)   SpO2 98%   BMI 28.67 kg/m²       General:  alert, cooperative, appears stated age   Abdomen: soft, bowel sounds active, non-tender   Incision:   healing well, no drainage, no erythema, no seroma, no swelling, no dehiscence, incision well approximated     Assessment:     Doing well postoperatively. Plan:     1. Follow up as needed. 2. May increase activity as tolerated. 3. Reviewed the above with Dr. Carrie Richard.

## 2020-01-28 NOTE — PROGRESS NOTES
1. Have you been to the ER, urgent care clinic since your last visit? Hospitalized since your last visit? No    2. Have you seen or consulted any other health care providers outside of the [address] [place] [address] since your last visit? Include any pap smears or colon screening.  no

## 2020-07-07 ENCOUNTER — OFFICE VISIT (OUTPATIENT)
Dept: SURGERY | Age: 75
End: 2020-07-07

## 2020-07-07 VITALS
DIASTOLIC BLOOD PRESSURE: 72 MMHG | HEIGHT: 66 IN | SYSTOLIC BLOOD PRESSURE: 145 MMHG | OXYGEN SATURATION: 95 % | BODY MASS INDEX: 29.67 KG/M2 | RESPIRATION RATE: 19 BRPM | WEIGHT: 184.6 LBS | HEART RATE: 107 BPM | TEMPERATURE: 98.9 F

## 2020-07-07 DIAGNOSIS — D17.22 LIPOMA OF LEFT UPPER EXTREMITY: Primary | ICD-10-CM

## 2020-07-07 DIAGNOSIS — D17.0 LIPOMA OF NECK: ICD-10-CM

## 2020-07-07 RX ORDER — TRAMADOL HYDROCHLORIDE 50 MG/1
50 TABLET ORAL
Status: ON HOLD | COMMUNITY
End: 2020-07-29 | Stop reason: SDUPTHER

## 2020-07-07 NOTE — LETTER
7/8/20 Patient: Avril Solis YOB: 1945 Date of Visit: 7/7/2020 Juanjo Dean MD 
[address] [address] VIA In Basket Dear Juanjo Dean MD, Thank you for referring Mr. Petra Brand to [facility] for evaluation. My notes for this consultation are attached. If you have questions, please do not hesitate to call me. I look forward to following your patient along with you.  
 
 
Sincerely, 
 
Ronn Chavez MD

## 2020-07-07 NOTE — PROGRESS NOTES
1. Have you been to the ER, urgent care clinic since your last visit? Hospitalized since your last visit? No    2. Have you seen or consulted any other health care providers outside of the [address] [place] [address] since your last visit? Include any pap smears or colon screening.  No

## 2020-07-08 ENCOUNTER — TELEPHONE (OUTPATIENT)
Dept: SURGERY | Age: 75
End: 2020-07-08

## 2020-07-08 PROBLEM — D17.9 MULTIPLE LIPOMAS: Status: ACTIVE | Noted: 2020-07-08

## 2020-07-08 NOTE — PROGRESS NOTES
[facility] History and Physical    Chief Complaint: Lipoma of left arm, lipoma of posterior neck    History of Present Illness:      Mario Brand is a 70 y.o. male who is known to me for history of a laparoscopic ileocecectomy for a sessile adenomatous polyp of the appendiceal orifice on 1/10/20. His pathology showed a tubular adenoma. He states he has recovered very well from the surgery and has no GI complaints or issues with his incisions at this time. He now presents with complaints related to 2 lipomas. These both began as very small lumps and have grown over the course of many years. He states they are now more bothersome although not painful and he would like them removed. The first lesion is on the left arm overlying the biceps muscle and the second is in the midline of his posterior neck near the end of the cervical spine. Past Medical History:   Diagnosis Date    Acid indigestion     Arthritis 8/20/2012    Back pain     CAD (coronary artery disease)     Chronic pain     BACK    Diabetes ([address]     BORDERLINE    GERD (gastroesophageal reflux disease) 8/20/2012    HTN (hypertension) 8/20/2012    Hyperlipemia 8/20/2012    IH (inguinal hernia) 2/22/2015 2015, small right , reducible IH .      Ill-defined condition 12/2018    TIA, double vision    Insomnia     Stroke ([address] 12/2018    TIA     Thyroid disorder 8/20/2012       Past Surgical History:   Procedure Laterality Date    CARDIAC SURG PROCEDURE UNLIST  2005    CABG-3 VESSEL    COLONOSCOPY N/A 10/7/2019    COLONOSCOPY performed by Joaquim Kelly MD at [facility] ENDOSCOPY    HX COLECTOMY Right 01/10/2020    Lap right coloectomy-Dr. Jonathan Hinojosa @ [facility]     HX CORONARY ARTERY BYPASS GRAFT  2006    HX HEENT      NASAL SURGERY    HX THYROIDECTOMY         Social History     Socioeconomic History    Marital status: SINGLE     Spouse name: Not on file    Number of children: Not on file    Years of education: Not on file    Highest education level: Not on file   Occupational History    Not on file   Social Needs    Financial resource strain: Not on file    Food insecurity     Worry: Not on file     Inability: Not on file    Transportation needs     Medical: Not on file     Non-medical: Not on file   Tobacco Use    Smoking status: Former Smoker     Packs/day: 1.00     Years: 10.00     Pack years: 10.00     Types: Cigarettes     Last attempt to quit: 1980     Years since quittin.5    Smokeless tobacco: Never Used   Substance and Sexual Activity    Alcohol use: Not on file     Comment: 1-2 DRINKS A YEAR    Drug use: Never    Sexual activity: Yes   Lifestyle    Physical activity     Days per week: Not on file     Minutes per session: Not on file    Stress: Not on file   Relationships    Social connections     Talks on phone: Not on file     Gets together: Not on file     Attends Islam service: Not on file     Active member of club or organization: Not on file     Attends meetings of clubs or organizations: Not on file     Relationship status: Not on file    Intimate partner violence     Fear of current or ex partner: Not on file     Emotionally abused: Not on file     Physically abused: Not on file     Forced sexual activity: Not on file   Other Topics Concern    Not on file   Social History Narrative    Not on file       Family History   Problem Relation Age of Onset    Cancer Father         stomach, liver WITH METS    Cancer Maternal Uncle         prostate    Cancer Paternal Aunt     Cancer Paternal Uncle     Cancer Mother         lung ca    Diabetes Mother     Heart Disease Neg Hx     Anesth Problems Neg Hx          Current Outpatient Medications:     traMADoL (ULTRAM) 50 mg tablet, Take 50 mg by mouth every six (6) hours as needed for Pain., Disp: , Rfl:     levothyroxine (SYNTHROID) 125 mcg tablet, Take 1 Tab by mouth Daily (before breakfast). , Disp: 90 Tab, Rfl: 3   pantoprazole (PROTONIX) 40 mg tablet, Take 1 Tab by mouth daily. , Disp: 90 Tab, Rfl: 3    tamsulosin (Flomax) 0.4 mg capsule, Take 1 Cap by mouth daily. , Disp: 90 Cap, Rfl: 3    clopidogreL (PLAVIX) 75 mg tab, Take 1 Tab by mouth daily. , Disp: 90 Tab, Rfl: 3    atorvastatin (Lipitor) 20 mg tablet, Take 1 Tab by mouth nightly., Disp: 90 Tab, Rfl: 3    acetaminophen (TYLENOL) 325 mg tablet, Take 2 Tabs by mouth every four (4) hours as needed for Pain or Fever (For temp greater than or equal to 38.5 C or 101.3 F (Unless hepatic failure or contrindicated). Give first line for fever. )., Disp: 30 Tab, Rfl: 0    magnesium oxide (MAG-OX) 400 mg tablet, Take 400 mg by mouth daily. , Disp: , Rfl:     multivitamin (ONE A DAY) tablet, Take 1 Tab by mouth daily. , Disp: , Rfl:     omega-3 fatty acids-vitamin e (FISH OIL) 1,000 mg Cap, Take 1 Cap by mouth daily. , Disp: , Rfl:     No Known Allergies    ROS   Constitutional: Negative  Ears, Nose, Mouth, Throat, and Face: Negative  Respiratory: Negative  Cardiovascular: Negative  Gastrointestinal: Negative  Genitourinary: Negative  Integument/Breast: As in HPI  Hematologic/Lymphatic: Negative  Behavioral/Psychiatric: Negative  Allergic/Immunologic: Negative      Physical Exam:     Visit Vitals  /72 (BP 1 Location: Left arm, BP Patient Position: Sitting)   Pulse (!) 107   Temp 98.9 °F (37.2 °C) (Oral)   Resp 19   Ht 5' 6\" (1.676 m)   Wt 184 lb 9.6 oz (83.7 kg)   SpO2 95%   BMI 29.80 kg/m²       General - alert and oriented, no apparent distress  HEENT - NC/AT. No scleral icterus. At the posterior midline neck near C7, there is a 4 x 4 by 2 cm soft mobile mass in the subcutaneous tissue consistent with a lipoma. Pulm - CTAB, normal inspiratory effort  CV - RRR, no M/R/G  Abd - soft, NT, ND. Incisions well-healed without evidence of hernia. Ext - warm, well perfused, no edema.   On the left arm there is a soft and mobile 5 x 4 x 3 cm mass in the subcutaneous tissue consistent with lipoma that overlies the biceps muscle. Lymphatics - no cervical, supraclavicular or inguinal adenopathy noted. Skin - supple, no rashes  Psychiatric - normal affect, good mood    Labs  None    Imaging  None      Assessment:     Petra Brand is a 70 y.o. male with 2 lipomas: A left arm lipoma overlying the biceps muscle and a posterior cervical neck lipoma. Recommendations:     1. I have recommended excision of the lipomas as these have grown in size over time and are bothersome to the patient. A complete discussion of the risks, benefits and alternatives to surgery were discussed with the patient who was keen to proceed. The patient does take Plavix. I have asked him to stop this 1 week prior to surgery and he can take an 81 mg aspirin during that time instead. He may resume Plavix immediately after surgery. The patient was counseled at length about the risks of brandi Covid-19 during their perioperative period and any recovery window from their procedure. The patient was made aware that brandi Covid-19  may worsen their prognosis for recovering from their procedure and lend to a higher morbidity and/or mortality risk. All material risks, benefits, and reasonable alternatives including postponing the procedure were discussed. The patient does  wish to proceed with the procedure at this time. 30 mins of time was spent with the patient of which > 50% of the time involved face-to-face counseling of the patient regarding the proposed treatment plan. Tammy Vasquez MD  7/7/2020    CC:  Sameera Castaneda MD

## 2020-07-15 ENCOUNTER — HOSPITAL ENCOUNTER (OUTPATIENT)
Dept: PREADMISSION TESTING | Age: 75
Discharge: HOME | End: 2020-07-15
Payer: MEDICARE

## 2020-07-15 VITALS
DIASTOLIC BLOOD PRESSURE: 79 MMHG | WEIGHT: 187.17 LBS | TEMPERATURE: 98.9 F | SYSTOLIC BLOOD PRESSURE: 147 MMHG | OXYGEN SATURATION: 96 % | HEART RATE: 98 BPM | BODY MASS INDEX: 30.08 KG/M2 | HEIGHT: 66 IN

## 2020-07-15 LAB
ANION GAP SERPL CALC-SCNC: 8 MMOL/L (ref 5–15)
APTT PPP: 26.6 SEC (ref 22.1–32)
BUN SERPL-MCNC: 17 MG/DL (ref 6–20)
BUN/CREAT SERPL: 19 (ref 12–20)
CALCIUM SERPL-MCNC: 8.4 MG/DL (ref 8.5–10.1)
CHLORIDE SERPL-SCNC: 105 MMOL/L (ref 97–108)
CO2 SERPL-SCNC: 25 MMOL/L (ref 21–32)
CREAT SERPL-MCNC: 0.91 MG/DL (ref 0.7–1.3)
ERYTHROCYTE [DISTWIDTH] IN BLOOD BY AUTOMATED COUNT: 18.1 % (ref 11.5–14.5)
GLUCOSE SERPL-MCNC: 115 MG/DL (ref 65–100)
HCT VFR BLD AUTO: 38.6 % (ref 36.6–50.3)
HGB BLD-MCNC: 12.2 G/DL (ref 12.1–17)
INR PPP: 1.1 (ref 0.9–1.1)
MCH RBC QN AUTO: 24.7 PG (ref 26–34)
MCHC RBC AUTO-ENTMCNC: 31.6 G/DL (ref 30–36.5)
MCV RBC AUTO: 78.1 FL (ref 80–99)
NRBC # BLD: 0 K/UL (ref 0–0.01)
NRBC BLD-RTO: 0 PER 100 WBC
PLATELET # BLD AUTO: 206 K/UL (ref 150–400)
PMV BLD AUTO: 9.3 FL (ref 8.9–12.9)
POTASSIUM SERPL-SCNC: 4.2 MMOL/L (ref 3.5–5.1)
PROTHROMBIN TIME: 10.9 SEC (ref 9–11.1)
RBC # BLD AUTO: 4.94 M/UL (ref 4.1–5.7)
SODIUM SERPL-SCNC: 138 MMOL/L (ref 136–145)
THERAPEUTIC RANGE,PTTT: NORMAL SECS (ref 58–77)
WBC # BLD AUTO: 6.7 K/UL (ref 4.1–11.1)

## 2020-07-15 PROCEDURE — 85610 PROTHROMBIN TIME: CPT

## 2020-07-15 PROCEDURE — 85730 THROMBOPLASTIN TIME PARTIAL: CPT

## 2020-07-15 PROCEDURE — 85027 COMPLETE CBC AUTOMATED: CPT

## 2020-07-15 PROCEDURE — 36415 COLL VENOUS BLD VENIPUNCTURE: CPT

## 2020-07-15 PROCEDURE — 93005 ELECTROCARDIOGRAM TRACING: CPT

## 2020-07-15 PROCEDURE — 80048 BASIC METABOLIC PNL TOTAL CA: CPT

## 2020-07-15 NOTE — PERIOP NOTES
PAT instructions reviewed with patient and given the opportunity to ask questions. Patient given surgical site information FAQs handout and reviewed. Patient given two bottles CHG soap and instruction sheet, instructions for use reviewed with patient. Patient made aware of COVID 19 testing to be done 96  hours prior to surgery. Patient instructed to self quarantine between testing and arrival time day of surgery. Patient instructed re: check-in procedure for day of surgery and cell phone lot waiting area.

## 2020-07-15 NOTE — PERIOP NOTES
Connect care message sent to [place] - Dr. Stevens Mealing office re: patient does not want repeat CXR done today, had CXR 1/8/20 WNL. Question repeat CXR needed. AT    Petra Brand  Male, 70 y. o., 1945  Weight:   84.9 kg (187 lb 2.7 oz)  MRN:   [id number]  Phone:   [phone] [facility] Client)  PCP:   Sharyn Bernstein MD  Primary Cvg:   [employer] (MEDICARE)/[employer] (MEDICARE REPLACEMENT HMO)  Last Appt With Me  None  Next Appt With Me  None  Next Appt  8/12/20 - Adrian Bishop NP - [facility]  [address]  Message   Received: Today   Message Contents   Torri Mosley RN               Yes, acceptable(sorry I was in a meeting)     Thanks,   Vg    Previous Messages     ----- Message -----   From: Keller Sever, RN   Sent: 7/15/2020   2:36 PM EDT   To: [facility] HAD CXR DONE 1/8/20 WNL.  PATIENT DOES NOT WANT TO HAVE ANOTHER CXR TODAY.  IS CXR FROM [facility] ACCEPTABLE?      Farooq Watts [phone]

## 2020-07-16 LAB
ATRIAL RATE: 90 BPM
CALCULATED P AXIS, ECG09: 64 DEGREES
CALCULATED R AXIS, ECG10: 67 DEGREES
CALCULATED T AXIS, ECG11: 62 DEGREES
DIAGNOSIS, 93000: NORMAL
P-R INTERVAL, ECG05: 158 MS
Q-T INTERVAL, ECG07: 364 MS
QRS DURATION, ECG06: 74 MS
QTC CALCULATION (BEZET), ECG08: 445 MS
VENTRICULAR RATE, ECG03: 90 BPM

## 2020-07-16 NOTE — PERIOP NOTES
PLEASE REVIEW ABNORMAL LABS GLUCOSE H 115, CALCIUM L 8.4, MCV L78.1, MCH L 24.7, RDW H 18.1, ALL RESULTS CAN BE FOUND IN CC MESSAGE SENT TO DR. Antwon Tyson NURSE VIA CC MESSENGER

## 2020-07-25 ENCOUNTER — HOSPITAL ENCOUNTER (OUTPATIENT)
Dept: PREADMISSION TESTING | Age: 75
Discharge: HOME | End: 2020-07-25
Payer: MEDICARE

## 2020-07-25 DIAGNOSIS — Z20.822 ENCOUNTER FOR LABORATORY TESTING FOR COVID-19 VIRUS: ICD-10-CM

## 2020-07-25 PROCEDURE — 87635 SARS-COV-2 COVID-19 AMP PRB: CPT

## 2020-07-26 LAB — SARS-COV-2, COV2NT: NOT DETECTED

## 2020-07-29 ENCOUNTER — ANESTHESIA EVENT (OUTPATIENT)
Dept: SURGERY | Age: 75
End: 2020-07-29
Payer: MEDICARE

## 2020-07-29 ENCOUNTER — ANESTHESIA (OUTPATIENT)
Dept: SURGERY | Age: 75
End: 2020-07-29
Payer: MEDICARE

## 2020-07-29 ENCOUNTER — HOSPITAL ENCOUNTER (OUTPATIENT)
Age: 75
Setting detail: OUTPATIENT SURGERY
Discharge: HOME | End: 2020-07-29
Attending: SURGERY | Admitting: SURGERY
Payer: MEDICARE

## 2020-07-29 VITALS
HEIGHT: 66 IN | BODY MASS INDEX: 30.05 KG/M2 | SYSTOLIC BLOOD PRESSURE: 152 MMHG | WEIGHT: 187 LBS | DIASTOLIC BLOOD PRESSURE: 70 MMHG | RESPIRATION RATE: 16 BRPM | HEART RATE: 90 BPM | TEMPERATURE: 97 F | OXYGEN SATURATION: 97 %

## 2020-07-29 DIAGNOSIS — D17.9 MULTIPLE LIPOMAS: Primary | ICD-10-CM

## 2020-07-29 PROCEDURE — 74011250637 HC RX REV CODE- 250/637: Performed by: ANESTHESIOLOGY

## 2020-07-29 PROCEDURE — 77030010507 HC ADH SKN DERMBND J&J -B: Performed by: SURGERY

## 2020-07-29 PROCEDURE — 76210000006 HC OR PH I REC 0.5 TO 1 HR: Performed by: SURGERY

## 2020-07-29 PROCEDURE — 76010000153 HC OR TIME 1.5 TO 2 HR: Performed by: SURGERY

## 2020-07-29 PROCEDURE — 77030011640 HC PAD GRND REM COVD -A: Performed by: SURGERY

## 2020-07-29 PROCEDURE — 74011000250 HC RX REV CODE- 250: Performed by: NURSE ANESTHETIST, CERTIFIED REGISTERED

## 2020-07-29 PROCEDURE — 77030018836 HC SOL IRR NACL ICUM -A: Performed by: SURGERY

## 2020-07-29 PROCEDURE — 77030040922 HC BLNKT HYPOTHRM STRY -A

## 2020-07-29 PROCEDURE — 77030031139 HC SUT VCRL2 J&J -A: Performed by: SURGERY

## 2020-07-29 PROCEDURE — 77030002933 HC SUT MCRYL J&J -A: Performed by: SURGERY

## 2020-07-29 PROCEDURE — 74011000250 HC RX REV CODE- 250: Performed by: SURGERY

## 2020-07-29 PROCEDURE — 74011250636 HC RX REV CODE- 250/636: Performed by: NURSE ANESTHETIST, CERTIFIED REGISTERED

## 2020-07-29 PROCEDURE — 74011250636 HC RX REV CODE- 250/636: Performed by: ANESTHESIOLOGY

## 2020-07-29 PROCEDURE — 74011250636 HC RX REV CODE- 250/636: Performed by: SURGERY

## 2020-07-29 PROCEDURE — 77030040361 HC SLV COMPR DVT MDII -B: Performed by: SURGERY

## 2020-07-29 PROCEDURE — 76060000034 HC ANESTHESIA 1.5 TO 2 HR: Performed by: SURGERY

## 2020-07-29 PROCEDURE — 88304 TISSUE EXAM BY PATHOLOGIST: CPT

## 2020-07-29 PROCEDURE — 76210000021 HC REC RM PH II 0.5 TO 1 HR: Performed by: SURGERY

## 2020-07-29 PROCEDURE — 74011000250 HC RX REV CODE- 250: Performed by: ANESTHESIOLOGY

## 2020-07-29 RX ORDER — ROPIVACAINE HYDROCHLORIDE 5 MG/ML
30 INJECTION, SOLUTION EPIDURAL; INFILTRATION; PERINEURAL ONCE
Status: DISCONTINUED | OUTPATIENT
Start: 2020-07-29 | End: 2020-07-29 | Stop reason: HOSPADM

## 2020-07-29 RX ORDER — ONDANSETRON 2 MG/ML
4 INJECTION INTRAMUSCULAR; INTRAVENOUS AS NEEDED
Status: DISCONTINUED | OUTPATIENT
Start: 2020-07-29 | End: 2020-07-29 | Stop reason: HOSPADM

## 2020-07-29 RX ORDER — SODIUM CHLORIDE 9 MG/ML
25 INJECTION, SOLUTION INTRAVENOUS CONTINUOUS
Status: DISCONTINUED | OUTPATIENT
Start: 2020-07-29 | End: 2020-07-29 | Stop reason: HOSPADM

## 2020-07-29 RX ORDER — SODIUM CHLORIDE 0.9 % (FLUSH) 0.9 %
5-40 SYRINGE (ML) INJECTION AS NEEDED
Status: DISCONTINUED | OUTPATIENT
Start: 2020-07-29 | End: 2020-07-29 | Stop reason: HOSPADM

## 2020-07-29 RX ORDER — TRAMADOL HYDROCHLORIDE 50 MG/1
50 TABLET ORAL
Qty: 25 TAB | Refills: 0 | Status: SHIPPED | OUTPATIENT
Start: 2020-07-29 | End: 2020-08-05

## 2020-07-29 RX ORDER — SODIUM CHLORIDE, SODIUM LACTATE, POTASSIUM CHLORIDE, CALCIUM CHLORIDE 600; 310; 30; 20 MG/100ML; MG/100ML; MG/100ML; MG/100ML
125 INJECTION, SOLUTION INTRAVENOUS CONTINUOUS
Status: DISCONTINUED | OUTPATIENT
Start: 2020-07-29 | End: 2020-07-29 | Stop reason: HOSPADM

## 2020-07-29 RX ORDER — CEFAZOLIN SODIUM/WATER 2 G/20 ML
2 SYRINGE (ML) INTRAVENOUS ONCE
Status: COMPLETED | OUTPATIENT
Start: 2020-07-29 | End: 2020-07-29

## 2020-07-29 RX ORDER — ONDANSETRON 2 MG/ML
INJECTION INTRAMUSCULAR; INTRAVENOUS AS NEEDED
Status: DISCONTINUED | OUTPATIENT
Start: 2020-07-29 | End: 2020-07-29 | Stop reason: HOSPADM

## 2020-07-29 RX ORDER — SUCCINYLCHOLINE CHLORIDE 20 MG/ML
INJECTION INTRAMUSCULAR; INTRAVENOUS AS NEEDED
Status: DISCONTINUED | OUTPATIENT
Start: 2020-07-29 | End: 2020-07-29 | Stop reason: HOSPADM

## 2020-07-29 RX ORDER — FENTANYL CITRATE 50 UG/ML
25 INJECTION, SOLUTION INTRAMUSCULAR; INTRAVENOUS
Status: DISCONTINUED | OUTPATIENT
Start: 2020-07-29 | End: 2020-07-29 | Stop reason: HOSPADM

## 2020-07-29 RX ORDER — BUPIVACAINE HYDROCHLORIDE AND EPINEPHRINE 5; 5 MG/ML; UG/ML
INJECTION, SOLUTION EPIDURAL; INTRACAUDAL; PERINEURAL AS NEEDED
Status: DISCONTINUED | OUTPATIENT
Start: 2020-07-29 | End: 2020-07-29 | Stop reason: HOSPADM

## 2020-07-29 RX ORDER — MIDAZOLAM HYDROCHLORIDE 1 MG/ML
1 INJECTION, SOLUTION INTRAMUSCULAR; INTRAVENOUS AS NEEDED
Status: DISCONTINUED | OUTPATIENT
Start: 2020-07-29 | End: 2020-07-29 | Stop reason: HOSPADM

## 2020-07-29 RX ORDER — MIDAZOLAM HYDROCHLORIDE 1 MG/ML
0.5 INJECTION, SOLUTION INTRAMUSCULAR; INTRAVENOUS
Status: DISCONTINUED | OUTPATIENT
Start: 2020-07-29 | End: 2020-07-29 | Stop reason: HOSPADM

## 2020-07-29 RX ORDER — DIPHENHYDRAMINE HYDROCHLORIDE 50 MG/ML
12.5 INJECTION, SOLUTION INTRAMUSCULAR; INTRAVENOUS AS NEEDED
Status: DISCONTINUED | OUTPATIENT
Start: 2020-07-29 | End: 2020-07-29 | Stop reason: HOSPADM

## 2020-07-29 RX ORDER — ACETAMINOPHEN 325 MG/1
650 TABLET ORAL ONCE
Status: COMPLETED | OUTPATIENT
Start: 2020-07-29 | End: 2020-07-29

## 2020-07-29 RX ORDER — PHENYLEPHRINE HCL IN 0.9% NACL 0.4MG/10ML
SYRINGE (ML) INTRAVENOUS AS NEEDED
Status: DISCONTINUED | OUTPATIENT
Start: 2020-07-29 | End: 2020-07-29 | Stop reason: HOSPADM

## 2020-07-29 RX ORDER — MIDAZOLAM HYDROCHLORIDE 1 MG/ML
INJECTION, SOLUTION INTRAMUSCULAR; INTRAVENOUS AS NEEDED
Status: DISCONTINUED | OUTPATIENT
Start: 2020-07-29 | End: 2020-07-29 | Stop reason: HOSPADM

## 2020-07-29 RX ORDER — FENTANYL CITRATE 50 UG/ML
INJECTION, SOLUTION INTRAMUSCULAR; INTRAVENOUS AS NEEDED
Status: DISCONTINUED | OUTPATIENT
Start: 2020-07-29 | End: 2020-07-29 | Stop reason: HOSPADM

## 2020-07-29 RX ORDER — SODIUM CHLORIDE 0.9 % (FLUSH) 0.9 %
5-40 SYRINGE (ML) INJECTION EVERY 8 HOURS
Status: DISCONTINUED | OUTPATIENT
Start: 2020-07-29 | End: 2020-07-29 | Stop reason: HOSPADM

## 2020-07-29 RX ORDER — DEXAMETHASONE SODIUM PHOSPHATE 4 MG/ML
INJECTION, SOLUTION INTRA-ARTICULAR; INTRALESIONAL; INTRAMUSCULAR; INTRAVENOUS; SOFT TISSUE AS NEEDED
Status: DISCONTINUED | OUTPATIENT
Start: 2020-07-29 | End: 2020-07-29 | Stop reason: HOSPADM

## 2020-07-29 RX ORDER — HYDROMORPHONE HYDROCHLORIDE 1 MG/ML
0.2 INJECTION, SOLUTION INTRAMUSCULAR; INTRAVENOUS; SUBCUTANEOUS
Status: DISCONTINUED | OUTPATIENT
Start: 2020-07-29 | End: 2020-07-29 | Stop reason: HOSPADM

## 2020-07-29 RX ORDER — LIDOCAINE HYDROCHLORIDE 20 MG/ML
INJECTION, SOLUTION EPIDURAL; INFILTRATION; INTRACAUDAL; PERINEURAL AS NEEDED
Status: DISCONTINUED | OUTPATIENT
Start: 2020-07-29 | End: 2020-07-29 | Stop reason: HOSPADM

## 2020-07-29 RX ORDER — FENTANYL CITRATE 50 UG/ML
50 INJECTION, SOLUTION INTRAMUSCULAR; INTRAVENOUS AS NEEDED
Status: DISCONTINUED | OUTPATIENT
Start: 2020-07-29 | End: 2020-07-29 | Stop reason: HOSPADM

## 2020-07-29 RX ORDER — ROCURONIUM BROMIDE 10 MG/ML
INJECTION, SOLUTION INTRAVENOUS AS NEEDED
Status: DISCONTINUED | OUTPATIENT
Start: 2020-07-29 | End: 2020-07-29 | Stop reason: HOSPADM

## 2020-07-29 RX ORDER — SODIUM CHLORIDE, SODIUM LACTATE, POTASSIUM CHLORIDE, CALCIUM CHLORIDE 600; 310; 30; 20 MG/100ML; MG/100ML; MG/100ML; MG/100ML
75 INJECTION, SOLUTION INTRAVENOUS CONTINUOUS
Status: DISCONTINUED | OUTPATIENT
Start: 2020-07-29 | End: 2020-07-29 | Stop reason: HOSPADM

## 2020-07-29 RX ORDER — LIDOCAINE HYDROCHLORIDE 10 MG/ML
0.1 INJECTION, SOLUTION EPIDURAL; INFILTRATION; INTRACAUDAL; PERINEURAL AS NEEDED
Status: DISCONTINUED | OUTPATIENT
Start: 2020-07-29 | End: 2020-07-29 | Stop reason: HOSPADM

## 2020-07-29 RX ORDER — PROPOFOL 10 MG/ML
INJECTION, EMULSION INTRAVENOUS AS NEEDED
Status: DISCONTINUED | OUTPATIENT
Start: 2020-07-29 | End: 2020-07-29 | Stop reason: HOSPADM

## 2020-07-29 RX ORDER — MORPHINE SULFATE 10 MG/ML
2 INJECTION, SOLUTION INTRAMUSCULAR; INTRAVENOUS
Status: DISCONTINUED | OUTPATIENT
Start: 2020-07-29 | End: 2020-07-29 | Stop reason: HOSPADM

## 2020-07-29 RX ADMIN — ROCURONIUM BROMIDE 10 MG: 10 SOLUTION INTRAVENOUS at 09:44

## 2020-07-29 RX ADMIN — SUCCINYLCHOLINE CHLORIDE 120 MG: 20 INJECTION, SOLUTION INTRAMUSCULAR; INTRAVENOUS at 09:44

## 2020-07-29 RX ADMIN — Medication 40 MCG: at 10:28

## 2020-07-29 RX ADMIN — SODIUM CHLORIDE, POTASSIUM CHLORIDE, SODIUM LACTATE AND CALCIUM CHLORIDE 125 ML/HR: 600; 310; 30; 20 INJECTION, SOLUTION INTRAVENOUS at 08:38

## 2020-07-29 RX ADMIN — DEXAMETHASONE SODIUM PHOSPHATE 4 MG: 4 INJECTION, SOLUTION INTRAMUSCULAR; INTRAVENOUS at 09:57

## 2020-07-29 RX ADMIN — LIDOCAINE HYDROCHLORIDE 100 MG: 20 INJECTION, SOLUTION EPIDURAL; INFILTRATION; INTRACAUDAL; PERINEURAL at 09:44

## 2020-07-29 RX ADMIN — FENTANYL CITRATE 50 MCG: 50 INJECTION, SOLUTION INTRAMUSCULAR; INTRAVENOUS at 10:13

## 2020-07-29 RX ADMIN — PROPOFOL 50 MG: 10 INJECTION, EMULSION INTRAVENOUS at 09:53

## 2020-07-29 RX ADMIN — Medication 40 MCG: at 09:44

## 2020-07-29 RX ADMIN — FENTANYL CITRATE 50 MCG: 50 INJECTION, SOLUTION INTRAMUSCULAR; INTRAVENOUS at 09:44

## 2020-07-29 RX ADMIN — ACETAMINOPHEN 650 MG: 325 TABLET ORAL at 08:37

## 2020-07-29 RX ADMIN — Medication 2 G: at 10:00

## 2020-07-29 RX ADMIN — LIDOCAINE HYDROCHLORIDE 0.1 ML: 10 INJECTION, SOLUTION EPIDURAL; INFILTRATION; INTRACAUDAL; PERINEURAL at 08:38

## 2020-07-29 RX ADMIN — MIDAZOLAM 2 MG: 1 INJECTION INTRAMUSCULAR; INTRAVENOUS at 09:35

## 2020-07-29 RX ADMIN — ONDANSETRON HYDROCHLORIDE 4 MG: 2 INJECTION, SOLUTION INTRAMUSCULAR; INTRAVENOUS at 11:07

## 2020-07-29 RX ADMIN — Medication 40 MCG: at 10:33

## 2020-07-29 RX ADMIN — Medication 40 MCG: at 09:49

## 2020-07-29 RX ADMIN — PROPOFOL 150 MG: 10 INJECTION, EMULSION INTRAVENOUS at 09:44

## 2020-07-29 NOTE — ANESTHESIA PREPROCEDURE EVALUATION
Relevant Problems   No relevant active problems       Anesthetic History   No history of anesthetic complications            Review of Systems / Medical History  Patient summary reviewed, nursing notes reviewed and pertinent labs reviewed    Pulmonary  Within defined limits                 Neuro/Psych   Within defined limits           Cardiovascular    Hypertension: well controlled          CAD         GI/Hepatic/Renal     GERD: well controlled           Endo/Other    Diabetes: well controlled, type 2  Hypothyroidism: well controlled       Other Findings              Physical Exam    Airway  Mallampati: II  TM Distance: > 6 cm  Neck ROM: normal range of motion   Mouth opening: Normal     Cardiovascular  Regular rate and rhythm,  S1 and S2 normal,  no murmur, click, rub, or gallop             Dental  No notable dental hx       Pulmonary  Breath sounds clear to auscultation               Abdominal  GI exam deferred       Other Findings            Anesthetic Plan    ASA: 2  Anesthesia type: general          Induction: Intravenous  Anesthetic plan and risks discussed with: Patient

## 2020-07-29 NOTE — ROUTINE PROCESS
Patient: Varinder Eaton MRN: [id number]  SSN: [id number] YOB: 1945  Age: 70 y.o. Sex: male Patient is status post Procedure(s): 
Excision of left arm lipoma Excision of posterior neck lipoma. Surgeon(s) and Role: Katherin Alicea MD - Primary Local/Dose/Irrigation: 
Posterior neck: 10 cc Bupivacaine 0.5% with epi Left upper arm: 10 cc Bupivacaine 0.5% with epi Peripheral IV 07/29/20 Right Hand (Active) Airway - Endotracheal Tube 07/29/20 Oral (Active) Dressing/Packing:  Wound Neck-Dressing Type: Topical skin adhesive/glue(Dermabond) (07/29/20 1031) Wound Arm Left-Dressing Type: Topical skin adhesive/glue;Elastic bandage;4 x 4(Dermabond) (07/29/20 1103) Splint/Cast:  ] Other:  n/a

## 2020-07-29 NOTE — ANESTHESIA POSTPROCEDURE EVALUATION
Post-Anesthesia Evaluation and Assessment    Patient: Marco Marsh MRN: [id number]  SSN: [id number]    YOB: 1945  Age: 70 y.o. Sex: male      I have evaluated the patient and they are stable and ready for discharge from the PACU. Cardiovascular Function/Vital Signs  Visit Vitals  /70   Pulse 90   Temp 36.1 °C (97 °F)   Resp 16   Ht 5' 6\" (1.676 m)   Wt 84.8 kg (187 lb)   SpO2 97%   BMI 30.18 kg/m²       Patient is status post General anesthesia for Procedure(s):  Excision of left arm lipoma  Excision of posterior neck lipoma. Nausea/Vomiting: None    Postoperative hydration reviewed and adequate. Pain:  Pain Scale 1: Numeric (0 - 10) (07/29/20 1202)  Pain Intensity 1: 0 (07/29/20 1202)   Managed    Neurological Status:   Neuro (WDL): Within Defined Limits (07/29/20 0832)   At baseline    Mental Status, Level of Consciousness: Alert and  oriented to person, place, and time    Pulmonary Status:   O2 Device: Room air (07/29/20 1202)   Adequate oxygenation and airway patent    Complications related to anesthesia: None    Post-anesthesia assessment completed. No concerns    Signed By: Jess Garcia MD     July 29, 2020              Procedure(s):  Excision of left arm lipoma  Excision of posterior neck lipoma. general    <BSHSIANPOST>    INITIAL Post-op Vital signs:   Vitals Value Taken Time   /64 7/29/2020 11:45 AM   Temp 36.4 °C (97.6 °F) 7/29/2020 11:17 AM   Pulse 88 7/29/2020 11:46 AM   Resp 12 7/29/2020 11:46 AM   SpO2 94 % 7/29/2020 11:46 AM   Vitals shown include unvalidated device data.

## 2020-07-29 NOTE — BRIEF OP NOTE
Brief Postoperative Note    Patient: Johnathon Giles  YOB: 1945  MRN: [id number]    Date of Procedure: 7/29/2020     Pre-Op Diagnosis: LEFT ARM LIPOMA; POSTERIOR NECK LIPOMA    Post-Op Diagnosis: Same as preoperative diagnosis. Procedure(s):  Excision of left arm lipoma (6 x 5 x 3 cm)  Excision of posterior neck lipoma (5 x 4 x 2 cm)    Surgeon(s): Natasha Garner MD    Surgical Assistant: Alexandrea Cobb    Anesthesia: General     Estimated Blood Loss (mL): 5 mL    Complications: None    Specimens:   ID Type Source Tests Collected by Time Destination   1 : Posterior neck lipoma Fresh Lipoma  Natasha Garner MD 7/29/2020 1021 Pathology   2 : Left arm lipoma Fresh Arm, Left  Natasha Garner MD 7/29/2020 1059 Pathology        Implants: * No implants in log *    Drains: * No LDAs found *    Findings: Lipomas of posterior neck and left biceps.       Electronically Signed by Juan Diego Dent MD on 7/29/2020 at 11:22 AM

## 2020-07-29 NOTE — H&P
Date of Surgery Update:  Petra Brand was seen and examined. History and physical has been reviewed. The patient has been examined. There have been no significant clinical changes since the completion of the originally dated History and Physical.  Patient with enlarging lipomas on posterior cervical neck and overlying left biceps muscle. A complete discussion of the risks, benefits and alternatives to surgery were discussed with the patient who was keen to proceed. The patient was counseled at length about the risks of brandi Covid-19 during their perioperative period and any recovery window from their procedure. The patient was made aware that brandi Covid-19  may worsen their prognosis for recovering from their procedure and lend to a higher morbidity and/or mortality risk. All material risks, benefits, and reasonable alternatives including postponing the procedure were discussed. The patient does  wish to proceed with the procedure at this time. Signed By: Analisa Goldsmith MD     July 29, 2020 9:02 AM         Please note from the office and include the additional information below:    Past Medical History  Past Medical History:   Diagnosis Date    Acid indigestion     Arthritis 8/20/2012    Back pain     CAD (coronary artery disease) 2005    CABG X 3    Chronic pain     BACK    Diabetes ([address]     BORDERLINE NO MEDS CURRNTLY (7-15-20)    Enlarged prostate     GERD (gastroesophageal reflux disease) 8/20/2012    HTN (hypertension) 8/20/2012    NO MEDS CURRENTLY (7-15-20)    Hyperlipemia 8/20/2012    IH (inguinal hernia) 2/22/2015    2015, small right , reducible IH .      Ill-defined condition 12/2018    TIA, double vision    Insomnia     Stroke ([address] 12/2018    TIA - H/O DOUBLE VISION    Thyroid disorder 8/20/2012        Past Surgical History  Past Surgical History:   Procedure Laterality Date    CARDIAC SURG PROCEDURE UNLIST  2005    CABG-3 VESSEL    COLONOSCOPY N/A 10/7/2019    COLONOSCOPY performed by Johana Kebede MD at [address] [place] [address] ENDOSCOPY    HX COLECTOMY Right 01/10/2020    Lap right coloectomy-Dr. Kristian Mccallum @ [address] [place] [address]     HX CORONARY ARTERY BYPASS GRAFT  2006    HX HEENT      NASAL SURGERY    HX HEENT      THYROIDECTOMY    HX THYROIDECTOMY          Social History  The patient Petra Brand  reports that he quit smoking about 40 years ago. His smoking use included cigarettes. He has a 10.00 pack-year smoking history. He has never used smokeless tobacco. He reports previous alcohol use. He reports that he does not use drugs.      Family History  Family History   Problem Relation Age of Onset    Cancer Father         stomach, liver WITH METS    Cancer Maternal Uncle         prostate    Cancer Paternal Aunt     Cancer Paternal Uncle     Cancer Mother         lung ca    Diabetes Mother     Heart Disease Neg Hx     Anesth Problems Neg Hx           Kristian Mccallum MD

## 2020-07-29 NOTE — OP NOTES
[address]   OPERATIVE REPORT    Name:  Haim Giron  MR#:  [id number]  :  1945  ACCOUNT #:  [de-identified]  DATE OF SERVICE:  2020      PREOPERATIVE DIAGNOSES:  1. Left arm lipoma. 2.  Posterior neck lipoma. POSTOPERATIVE DIAGNOSES:  1. Left arm lipoma. 2.  Posterior neck lipoma. PROCEDURES PERFORMED:  1. Excision of left arm lipoma (6 x 5 x 3 cm). 2.  Excision of posterior neck lipoma (5 x 4 x 2 cm). SURGEON:  Kathleen Hernandez. MD Zoraida    ASSISTANT:  Jose Barnett SA    ANESTHESIA:  General.    COMPLICATIONS:  None. SPECIMENS REMOVED:  1. Posterior neck lipoma. 2.  Left arm lipoma. Disposition of specimen was Pathology. IMPLANTS:  None. ESTIMATED BLOOD LOSS:  5 mL. FINDINGS:  The patient had simple appearing lipomatous masses anterior to the biceps on the left forearm as well as overlying the cervical portion of the posterior neck. These were excised completely. INDICATIONS FOR PROCEDURE:  The patient is a 51-year-old male who has had soft masses overlying the left biceps muscle as well as on the posterior aspect of his neck. He states these started out as small lesions and have grown over time. They are now causing him some discomfort and he would like them removed. Complete discussion of risks, benefits, and alternatives of surgery were had with the patient, and he was in agreement to proceed. Informed consent was obtained. DESCRIPTION OF THE OPERATION:  After informed consent was obtained, the patient was brought back to the operating room. He was placed under general endotracheal anesthesia in the supine position on the operating room table initially. His lipomas had been marked preoperatively. We started with the posterior neck lipoma and thus the patient was rolled into the lateral decubitus position with the right side up on a beanbag.   Care was taken to place padding on all pressure points and a gel roll was placed underneath the axilla. The patient was then prepped and draped in the usual sterile fashion. The operative site was identified that had been marked preoperatively and a proper time-out was performed. With this completed, we injected local anesthetic into the skin and subcutaneous tissue overlying the palpable lipoma along the posterior aspect of the cervical neck. This was likely in the region of C6 and C7. The skin was incised using 15-blade in a transverse orientation to the spine. We dissected down through the subcutaneous tissue using electrocautery until the lipoma was encountered. We dissected the lipomatous tissue free from the surrounding subcutaneous tissue using dissection with a hemostat as well as electrocautery. There were several finger-like projections from this lipoma extending out into the surrounding subcutaneous tissue which were dissected free and removed. This extended down to the underlying muscular fascia. The lipoma was superficial to this, however, and was excised completely. Once this was dissected free circumferentially, this was passed off the field as a permanent specimen. This lesion had a few finger-like projections; however, the general size was approximately 5 x 4 x 2 cm in size. The cavity was then copiously irrigated and hemostasis was assured. We injected additional local anesthetic. We then closed the deeper subcutaneous tissues using interrupted 2-0 Vicryl sutures followed by the deeper subcutaneous tissue and deep dermis with interrupted 3-0 Vicryl suture. The skin was reapproximated using a 4-0 Monocryl subcuticular stitch followed by Dermabond skin adhesive. Once this was allowed to dry, the patient was then turned into the supine position for exposure of his left arm. This was then prepped and draped with new drapery and the operative site which had been marked preoperatively was identified. We paused for a second time-out.   With this completed, I injected local anesthetic into the skin and subcutaneous tissue overlying this lesion on the anterior surface of the biceps muscle. This was then opened at the level of the skin using a 15-blade scalpel and a longitudinal orientation along the axis of the humerus. We dissected down through the subcutaneous tissue using electrocautery until the lipoma was encountered. This lipoma was a little more well encapsulated and was easily dissected free from the surrounding subcutaneous tissue in the underlying biceps fascia. This was superficial to the muscular fascia. This was dissected free using a hemostat as well as electrocautery. The lipoma was completely excised and removed intact and passed off the field as a specimen. We copiously irrigated the wound assuring good hemostasis and injected additional local anesthetic. The incision was then closed using 3-0 Vicryl interrupted sutures to reapproximate the deep dermis and subcutaneous tissue followed by running 4-0 Monocryl subcuticular stitch and Dermabond skin adhesive on the skin. Once this was allowed to dry, an Ace bandage was applied over the incision to allow for compression to minimize seroma formation. The patient was then awakened, extubated, and taken to the postanesthesia care unit in stable condition. All needle and instrument counts were correct at the completion of the case. I was present and scrubbed throughout the entirety of the case. There were no immediate complications. DISPOSITION:  PACU.         MD FRANCES [email or web address]  D:  07/29/2020 11:37  T:  07/29/2020 13:08  JOB #:  [id number]

## 2020-07-29 NOTE — DISCHARGE INSTRUCTIONS
Patient Discharge Instructions    Janessa Morley / [id number] : 1945    Admitted 2020 Discharged: 2020       · It is important that you take the medication exactly as they are prescribed. · Keep your medication in the bottles provided by the pharmacist and keep a list of the medication names, dosages, and times to be taken in your wallet. · Do not take other medications without consulting your doctor. · You may resume your Plavix on 2020 as long as no signs of bleeding are noted. What to do at Home    Recommended diet: Resume previous diet    Recommended activity: No strenuous activity for 1 week. No Driving While Taking narcotic pain medications. May Take Shower 1 day after surgery. Do not submerge your incisions under water (pool, bath, hot tub) for 14 days. You have skin glue on your incisions which will come off in 2-3 weeks. Keep an ACE bandage over your left biceps incision except when showering for the next 1 week. If you experience any of the following symptoms Fevers, Chills, Redness or Drainage at Surgical Site(s) or Any Other Questions or Concerns Please Call -  [phone]. Follow-up with Dr. Rosa Neri in ~ 14 days. Information obtained by :  I understand that if any problems occur once I am at home I am to contact my physician. I understand and acknowledge receipt of the instructions indicated above.                                                                                                                                            Physician's or R.N.'s Signature                                                                  Date/Time                                                                                                                                              Patient or Representative Signature                                                          Date/Time

## 2020-08-12 ENCOUNTER — VIRTUAL VISIT (OUTPATIENT)
Dept: SURGERY | Age: 75
End: 2020-08-12
Payer: MEDICARE

## 2020-08-12 DIAGNOSIS — Z09 POSTOPERATIVE EXAMINATION: ICD-10-CM

## 2020-08-12 DIAGNOSIS — D17.9 LIPOMA, UNSPECIFIED SITE: Primary | ICD-10-CM

## 2020-08-12 PROCEDURE — 99024 POSTOP FOLLOW-UP VISIT: CPT | Performed by: NURSE PRACTITIONER

## 2020-08-12 NOTE — PROGRESS NOTES
1. Have you been to the ER, urgent care clinic since your last visit? Hospitalized since your last visit? No    2. Have you seen or consulted any other health care providers outside of the [address] [place] [address] since your last visit? Include any pap smears or colon screening.  No

## 2020-08-12 NOTE — PROGRESS NOTES
I was in the office while conducting this encounter. Consent:  He and/or his healthcare decision maker is aware that this patient-initiated Telehealth encounter is a billable service, with coverage as determined by his insurance carrier. He is aware that he may receive a bill and has provided verbal consent to proceed: No - Not billable    This virtual visit was conducted telephone encounter only. -  I affirm this is a Patient Initiated Episode with an Established Patient who has not had a related appointment within my department in the past 7 days or scheduled within the next 24 hours. Note: this encounter is not billable if this call serves to triage the patient into an appointment for the relevant concern. Total Time: minutes: 5-10 minutes. Subjective:    Petra Brand is a 70 y.o. male presents for postop care following excision of lipomas from posterior neck and left arm by Dr. Aleena Plata. He is receiving wound care by self who reports no problems with wound care. The patient is not having any pain, just has some stiffness around his neck. .  Denies redness, drainage, fever    Objective:        FINAL PATHOLOGIC DIAGNOSIS   1. Soft tissue, posterior neck, excision:   Mature fibroadipose tissue compatible with lipoma   2. Soft tissue, left arm, excision:   Angiolipoma     Assessment:     S/P excision of lipomas. Doing well postoperatively. Plan:     1. Wound care discussed. Healing well, encouraged ROM exercises for neck  2. Pt is to increase activities as tolerated. .  3. Follow-up prn    Mr. Blaire Austin has a reminder for a \"due or due soon\" health maintenance. I have asked that he contact his primary care provider for follow-up on this health maintenance. Patient verbalized understanding and agreement.

## 2020-08-12 NOTE — PATIENT INSTRUCTIONS
Lipoma: Care Instructions Your Care Instructions A lipoma is a growth of fat just below the skin. It may feel soft and rubbery. Lipomas can occur anywhere on the body. But they are most common on the torso, neck, upper thighs, upper arms, and armpits. A lipoma does not turn into cancer. Lipomas usually are not treated, because most of them don't hurt or cause problems. But your doctor may remove a lipoma if it is painful, gets infected, or bothers you. Follow-up care is a key part of your treatment and safety. Be sure to make and go to all appointments, and call your doctor if you are having problems. It's also a good idea to know your test results and keep a list of the medicines you take. How can you care for yourself at home? · A lipoma usually needs no care at home unless your doctor made a cut (incision) to remove it. · If your doctor told you how to care for your incision, follow your doctor's instructions. If you did not get instructions, follow this general advice: 
? Wash around the incision with clean water 2 times a day. Don't use hydrogen peroxide or alcohol. These can slow healing. ? You may cover the incision with a thin layer of petroleum jelly, such as Vaseline, and a nonstick bandage. ? Apply more petroleum jelly and replace the bandage as needed. When should you call for help? Call your doctor now or seek immediate medical care if: 
· You have signs of infection, such as: 
? Increased pain, swelling, warmth, or redness. ? Red streaks leading from the lipoma. ? Pus draining from the lipoma. ? A fever. Watch closely for changes in your health, and be sure to contact your doctor if: · The lipoma is growing or changing. · You do not get better as expected. Where can you learn more? Go to http://mp-thad.info/ Enter F536 in the search box to learn more about \"Lipoma: Care Instructions. \" 
 Current as of: October 31, 2019               Content Version: 12.5 © 3435-0233 [employer]. Care instructions adapted under license by [employer] (which disclaims liability or warranty for this information). If you have questions about a medical condition or this instruction, always ask your healthcare professional. [address] any warranty or liability for your use of this information.

## 2020-08-19 ENCOUNTER — DOCUMENTATION ONLY (OUTPATIENT)
Dept: SURGERY | Age: 75
End: 2020-08-19

## 2020-08-19 NOTE — PROGRESS NOTES
Received fax from [facility] requesting medical records. Forwarded to [facility] for response.

## 2021-01-08 ENCOUNTER — OFFICE VISIT (OUTPATIENT)
Dept: CARDIOLOGY CLINIC | Age: 76
End: 2021-01-08
Payer: MEDICARE

## 2021-01-08 VITALS
DIASTOLIC BLOOD PRESSURE: 80 MMHG | OXYGEN SATURATION: 99 % | BODY MASS INDEX: 29.25 KG/M2 | HEART RATE: 80 BPM | RESPIRATION RATE: 18 BRPM | WEIGHT: 182 LBS | SYSTOLIC BLOOD PRESSURE: 130 MMHG | HEIGHT: 66 IN

## 2021-01-08 DIAGNOSIS — I25.110 CORONARY ARTERY DISEASE INVOLVING NATIVE CORONARY ARTERY OF NATIVE HEART WITH UNSTABLE ANGINA PECTORIS (HCC): Primary | ICD-10-CM

## 2021-01-08 DIAGNOSIS — E78.2 MIXED HYPERLIPIDEMIA: ICD-10-CM

## 2021-01-08 DIAGNOSIS — I10 ESSENTIAL HYPERTENSION: ICD-10-CM

## 2021-01-08 PROCEDURE — 93000 ELECTROCARDIOGRAM COMPLETE: CPT | Performed by: SPECIALIST

## 2021-01-08 PROCEDURE — G8427 DOCREV CUR MEDS BY ELIG CLIN: HCPCS | Performed by: SPECIALIST

## 2021-01-08 PROCEDURE — G8417 CALC BMI ABV UP PARAM F/U: HCPCS | Performed by: SPECIALIST

## 2021-01-08 PROCEDURE — G8754 DIAS BP LESS 90: HCPCS | Performed by: SPECIALIST

## 2021-01-08 PROCEDURE — 1101F PT FALLS ASSESS-DOCD LE1/YR: CPT | Performed by: SPECIALIST

## 2021-01-08 PROCEDURE — G8536 NO DOC ELDER MAL SCRN: HCPCS | Performed by: SPECIALIST

## 2021-01-08 PROCEDURE — G8510 SCR DEP NEG, NO PLAN REQD: HCPCS | Performed by: SPECIALIST

## 2021-01-08 PROCEDURE — 99214 OFFICE O/P EST MOD 30 MIN: CPT | Performed by: SPECIALIST

## 2021-01-08 PROCEDURE — G8752 SYS BP LESS 140: HCPCS | Performed by: SPECIALIST

## 2021-01-08 PROCEDURE — G9711 PT HX TOT COL OR COLON CA: HCPCS | Performed by: SPECIALIST

## 2021-01-08 RX ORDER — NITROGLYCERIN 0.4 MG/1
0.4 TABLET SUBLINGUAL
Qty: 1 BOTTLE | Refills: 3 | Status: SHIPPED | OUTPATIENT
Start: 2021-01-08

## 2021-01-08 RX ORDER — LISINOPRIL 5 MG/1
5 TABLET ORAL DAILY
Qty: 90 TAB | Refills: 1 | Status: SHIPPED | OUTPATIENT
Start: 2021-01-08 | End: 2021-09-08 | Stop reason: SDUPTHER

## 2021-01-08 NOTE — PROGRESS NOTES
Petra Brand     1945       Ira Telles MD, [facility]  Date of Visit-1/8/2021   PCP is Sherrie Valle MD   [address] [place] [address] and [facility] [place]  [facility]  HPI:  Jb Gamboa is a 78 y.o. male   1 year f/u. Hx of CABG in 2006 with no recent cardiology visit. Prior TIA on Plavix. He saw Dr. Shyann Wyatt until 2015, though the pt had told the surgeon he had not seen a cardiologist in 10 years. Our notes indicate 3 vessel CABG July 2005. Cath in August 2009 with open [address] to LAD, open SVG to PDA, but the PDA was closed at the insertion site, open SVG to OM1. Echo 12/28/18 EF 65-70%. Saw PCP in November with adjustment in thyroid. He had a lipoma resection in July. 12/16/19   ECHO STRESS 12/17/2019 12/17/2019    Narrative · Baseline ECG: Normal sinus rhythm. · Negative stress echocardiogram for evidence of ischemia. Low risk study. Signed by: Mary Rodriguez MD      He notes that he is getting left sided chest pain that radiates to his back with heavy activity or going up the stairs. He does not get the chest pain everyday. When he gets this chest pain he feels weak, like he might pass out, and tachycardic, so he sits down and rests until the pain goes away. A couple years ago he started experiencing blurry vision so he went to the doctor. He was told that he had a mini-stroke and was placed on Plavix, but he is now having an issue with diarrhea on the Plavix. He is having a procedure done on his left eyelid at [facility] next week. Denies edema, syncope or shortness of breath at rest    EKG: SR, LAE    Assessment/Plan:     1. Coronary artery disease involving native coronary artery of native heart with unstable angina pectoris ([address]  Increasing exertional chest pain. His CABG was 14 to 15 years ago. 11 years ago he had a closed PDA graft. I suspect we are better off going straight to cath and I don't think a stress test is needed. There is no resting pain but with minimal exertion he gets some pain. I am going to plan a left heart cath with grafts on the 20th or 21st. We went over risks and benefits and will use sublingual nitro. He had problems with Plavix so we will probably end up using Brilinta but will wait until he has had his eyelid surgery on the 14th.   - AMB POC EKG ROUTINE W/ 12 LEADS, INTER & REP  risks and benefits discussed  2/1000 serious life threatening risk includes stroke, heart attack leading to  death  1/100 prolong hospital stay above and bleeding, groin complications, infection, renals possible but  unlikely unless baseline renal dysfunction, tear in cardiac vessel, tamponade  PCI 6-6/399 similar complications but benefits likely outweigh risk   2. Mixed hyperlipidemia  Lipids on high potency statin as appropriate for secondary prevention. 3. Essential hypertension  Will add Lisinopril 5 mg daily. If he tolerates this will then add BB. He says he had some cough with Lisinopril years ago, but it was minor and he gets it for free so he strongly prefers this. Patient Instructions   Please start Lisinopril 5mg daily. Your procedure is scheduled for 1-20-20 @ 1315. You need to arrive about 2 hours prior to procedure, so please arrive by 1115 to [address] from the [facility] parking courtyard entrance. Once inside, go to the left to outpatient registration. Bring your insurance information and list of current medications. You may not have anything to eat or drink after Midnight, except for sips of water to take medications. *Have labs drawn prior to procedure (a couple days prior if possible.)  *You will need someone to drive you home after the procedure. Plan to be at [facility] a total of 6-8 hours. *Arrange for a responsible adult to help you at home for at least 24 hours,  *Wear comfortable clothing. Leave jewelry, money, and other valuables at home.  You may wear dentures, eyeglasses, and/or hearing aids. *Bring an overnight bag with you (just incase you need to spend the night.)     Post Procedure Instructions:  *No driving for 24 hours post procedure. *No heavy lifting (over 10 lbs) or strenuous activity for 48 hours. *No tub baths, swimming, hot tubs, or spas for 1 week. The band aid over cath site may be removed the day after procedure and site washed gently with soap and water. *The site may appear bruised/ discolored for a couple of weeks. A small knot may be present. You may experience tenderness or soreness in groin area. This may be relieved with the use of Tylenol. *If there is any visible blood at the site, hold pressure for 20 minutes. *Call the office if you should notice numbness, tingling, coldness, or loss of feeling in the area. Call the office if you have a fever within 2-3 days after procedure. Remember, when you begin lifting things, use proper body mechanics and bend with your knees, centering the weight on your legs. Please call with any questions: [phone]. You may also contact the cath lab directly at [phone]     Future Appointments   Date Time Provider Amy House   2/12/2021  9:40 AM Ira Burr MD [facility]            Impression:   1. Coronary artery disease involving native coronary artery of native heart with unstable angina pectoris ([facility]    2. Mixed hyperlipidemia    3. Essential hypertension       Cardiac History:   No specialty comments available. ROS-except as noted above. . A complete cardiac and respiratory are reviewed and negative except as above ; Resp-denies wheezing  or productive cough,.  Const- No unusual weight loss or fever; Neuro-no recent seizure or CVA ; GI- No BRBPR, abdom pain, bloating ; - no  hematuria   see supplement sheet, initialed and to be scanned by staff  Past Medical History:   Diagnosis Date    Acid indigestion     Arthritis 8/20/2012    Back pain     CAD (coronary artery disease) 2005    CABG X 3    Chronic pain     BACK    Diabetes ([facility] [address] [address]     BORDERLINE NO MEDS CURRNTLY (7-15-20)    Enlarged prostate     GERD (gastroesophageal reflux disease) 8/20/2012    HTN (hypertension) 8/20/2012    NO MEDS CURRENTLY (7-15-20)    Hyperlipemia 8/20/2012    IH (inguinal hernia) 2/22/2015    2015, small right , reducible IH .  Ill-defined condition 12/2018    TIA, double vision    Insomnia     Stroke ([facility] [address] 12/2018    TIA - H/O DOUBLE VISION    Thyroid disorder 8/20/2012      Social Hx= reports that he quit smoking about 41 years ago. His smoking use included cigarettes. He has a 10.00 pack-year smoking history. He has never used smokeless tobacco. He reports previous alcohol use. He reports that he does not use drugs. Exam and Labs:  /80 (BP 1 Location: Left arm, BP Patient Position: Sitting)   Pulse 80   Resp 18   Ht 5' 6\" (1.676 m)   Wt 182 lb (82.6 kg)   SpO2 99%   BMI 29.38 kg/m² Constitutional:  NAD, comfortable  Head: NC,AT. Eyes: No scleral icterus. Neck:  Neck supple. No JVD present. Throat: moist mucous membranes. Chest: Effort normal & normal respiratory excursion . Neurological: alert, conversant and oriented . Skin: Skin is not cold. No obvious systemic rash noted. Not diaphoretic. No erythema. Psychiatric:  Grossly normal mood and affect. Behavior appears normal. Extremities:  no clubbing or cyanosis. Abdomen: non distended    Lungs:breath sounds normal. No stridor. distress, wheezes or  Rales. Heart: normal rate, regular rhythm, normal S1, S2, no murmurs, rubs, clicks or gallops , PMI non displaced. Edema: Edema is none.   Lab Results   Component Value Date/Time    Cholesterol, total 145 12/28/2018 03:13 AM    HDL Cholesterol 35 12/28/2018 03:13 AM    LDL, calculated 66.2 12/28/2018 03:13 AM    Triglyceride 219 (H) 12/28/2018 03:13 AM    CHOL/HDL Ratio 4.1 12/28/2018 03:13 AM     Lab Results   Component Value Date/Time    Sodium 138 07/15/2020 02:04 PM    Potassium 4.2 07/15/2020 02:04 PM    Chloride 105 07/15/2020 02:04 PM    CO2 25 07/15/2020 02:04 PM    Anion gap 8 07/15/2020 02:04 PM    Glucose 115 (H) 07/15/2020 02:04 PM    BUN 17 07/15/2020 02:04 PM    Creatinine 0.91 07/15/2020 02:04 PM    BUN/Creatinine ratio 19 07/15/2020 02:04 PM    GFR est AA >60 07/15/2020 02:04 PM    GFR est non-AA >60 07/15/2020 02:04 PM    Calcium 8.4 (L) 07/15/2020 02:04 PM      Wt Readings from Last 3 Encounters:   01/08/21 182 lb (82.6 kg)   11/19/20 182 lb (82.6 kg)   09/08/20 182 lb (82.6 kg)      BP Readings from Last 3 Encounters:   01/08/21 130/80   11/19/20 130/70   07/29/20 152/70      Current Outpatient Medications   Medication Sig    lisinopriL (PRINIVIL, ZESTRIL) 5 mg tablet Take 1 Tab by mouth daily.  levothyroxine (SYNTHROID) 125 mcg tablet Take 1 Tab by mouth Daily (before breakfast).  tamsulosin (Flomax) 0.4 mg capsule Take 2 Caps by mouth daily.  pantoprazole (PROTONIX) 40 mg tablet Take 1 Tab by mouth daily.  atorvastatin (Lipitor) 20 mg tablet Take 1 Tab by mouth nightly. (Patient taking differently: Take 20 mg by mouth daily.)    acetaminophen (TYLENOL) 325 mg tablet Take 2 Tabs by mouth every four (4) hours as needed for Pain or Fever (For temp greater than or equal to 38.5 C or 101.3 F (Unless hepatic failure or contrindicated). Give first line for fever. ).  magnesium oxide (MAG-OX) 400 mg tablet Take 400 mg by mouth daily.  multivitamin (ONE A DAY) tablet Take 1 Tab by mouth daily.  omega-3 fatty acids-vitamin e (FISH OIL) 1,000 mg Cap Take 1 Cap by mouth daily.  clopidogreL (PLAVIX) 75 mg tab Take 1 Tab by mouth daily. No current facility-administered medications for this visit. Impression see above.       Written by Jeremie Bernardo, as dictated by Velia Navarrete MD.

## 2021-01-08 NOTE — PATIENT INSTRUCTIONS
Please start Lisinopril 5mg daily. Your procedure is scheduled for 1-20-20 @ 1315. You need to arrive about 2 hours prior to procedure, so please arrive by 1115 to [address] from the [facility] parking courtyard entrance. Once inside, go to the left to outpatient registration. Bring your insurance information and list of current medications. You may not have anything to eat or drink after Midnight, except for sips of water to take medications. *Have labs drawn prior to procedure (a couple days prior if possible.) *You will need someone to drive you home after the procedure. Plan to be at [facility] a total of 6-8 hours. *Arrange for a responsible adult to help you at home for at least 24 hours, 
*Wear comfortable clothing. Leave jewelry, money, and other valuables at home. You may wear dentures, eyeglasses, and/or hearing aids. *Bring an overnight bag with you (just incase you need to spend the night.) Post Procedure Instructions: 
*No driving for 24 hours post procedure. *No heavy lifting (over 10 lbs) or strenuous activity for 48 hours. *No tub baths, swimming, hot tubs, or spas for 1 week. The band aid over cath site may be removed the day after procedure and site washed gently with soap and water. *The site may appear bruised/ discolored for a couple of weeks. A small knot may be present. You may experience tenderness or soreness in groin area. This may be relieved with the use of Tylenol. *If there is any visible blood at the site, hold pressure for 20 minutes. *Call the office if you should notice numbness, tingling, coldness, or loss of feeling in the area. Call the office if you have a fever within 2-3 days after procedure. Remember, when you begin lifting things, use proper body mechanics and bend with your knees, centering the weight on your legs. Please call with any questions: [phone]. You may also contact the cath lab directly at [phone]

## 2021-01-08 NOTE — H&P (VIEW-ONLY)
Petra Brand     1945       Ira Bernard MD, [facility] Date of Visit-1/8/2021 PCP is Carmen Butler MD  
[facility] and [facility] [place] [facility] HPI:  Perfecto Hanson is a 78 y.o. male 1 year f/u. Hx of CABG in 2006 with no recent cardiology visit. Prior TIA on Plavix. He saw Dr. Sangeeta Ramos until 2015, though the pt had told the surgeon he had not seen a cardiologist in 10 years. Our notes indicate 3 vessel CABG July 2005. Cath in August 2009 with open [address] to LAD, open SVG to PDA, but the PDA was closed at the insertion site, open SVG to OM1. Echo 12/28/18 EF 65-70%. Saw PCP in November with adjustment in thyroid. He had a lipoma resection in July. 12/16/19 ECHO STRESS 12/17/2019 12/17/2019 Narrative · Baseline ECG: Normal sinus rhythm. · Negative stress echocardiogram for evidence of ischemia. Low risk study. Signed by: Nathaniel Hayward MD  
  
He notes that he is getting left sided chest pain that radiates to his back with heavy activity or going up the stairs. He does not get the chest pain everyday. When he gets this chest pain he feels weak, like he might pass out, and tachycardic, so he sits down and rests until the pain goes away. A couple years ago he started experiencing blurry vision so he went to the doctor. He was told that he had a mini-stroke and was placed on Plavix, but he is now having an issue with diarrhea on the Plavix. He is having a procedure done on his left eyelid at [facility] next week. Denies edema, syncope or shortness of breath at rest 
 
EKG: SR, ENRIQUE Assessment/Plan: 1. Coronary artery disease involving native coronary artery of native heart with unstable angina pectoris ([address] Increasing exertional chest pain. His CABG was 14 to 15 years ago. 11 years ago he had a closed PDA graft. I suspect we are better off going straight to cath and I don't think a stress test is needed. There is no resting pain but with minimal exertion he gets some pain. I am going to plan a left heart cath with grafts on the 20th or 21st. We went over risks and benefits and will use sublingual nitro. He had problems with Plavix so we will probably end up using Brilinta but will wait until he has had his eyelid surgery on the 14th.  
- AMB POC EKG ROUTINE W/ 12 LEADS, INTER & REP 
risks and benefits discussed 2/1000 serious life threatening risk includes stroke, heart attack leading to  death 1/100 prolong hospital stay above and bleeding, groin complications, infection, renals possible but  unlikely unless baseline renal dysfunction, tear in cardiac vessel, tamponade PCI 0-6/948 similar complications but benefits likely outweigh risk 2. Mixed hyperlipidemia Lipids on high potency statin as appropriate for secondary prevention. 3. Essential hypertension Will add Lisinopril 5 mg daily. If he tolerates this will then add BB. He says he had some cough with Lisinopril years ago, but it was minor and he gets it for free so he strongly prefers this. Patient Instructions Please start Lisinopril 5mg daily. Your procedure is scheduled for 1-20-20 @ 1315. You need to arrive about 2 hours prior to procedure, so please arrive by [address]5 to [address] from the [address] [facility] parking courtyard entrance. Once inside, go to the left to outpatient registration. Bring your insurance information and list of current medications. You may not have anything to eat or drink after Midnight, except for sips of water to take medications. *Have labs drawn prior to procedure (a couple days prior if possible.) *You will need someone to drive you home after the procedure. Plan to be at [facility] a total of 6-8 hours. *Arrange for a responsible adult to help you at home for at least 24 hours, 
*Wear comfortable clothing. Leave jewelry, money, and other valuables at home. You may wear dentures, eyeglasses, and/or hearing aids. *Bring an overnight bag with you (just incase you need to spend the night.) Post Procedure Instructions: 
*No driving for 24 hours post procedure. *No heavy lifting (over 10 lbs) or strenuous activity for 48 hours. *No tub baths, swimming, hot tubs, or spas for 1 week. The band aid over cath site may be removed the day after procedure and site washed gently with soap and water. *The site may appear bruised/ discolored for a couple of weeks. A small knot may be present. You may experience tenderness or soreness in groin area. This may be relieved with the use of Tylenol. *If there is any visible blood at the site, hold pressure for 20 minutes. *Call the office if you should notice numbness, tingling, coldness, or loss of feeling in the area. Call the office if you have a fever within 2-3 days after procedure. Remember, when you begin lifting things, use proper body mechanics and bend with your knees, centering the weight on your legs. Please call with any questions: [phone]. You may also contact the cath lab directly at [phone] Future Appointments Date Time Provider Amy Rahmani 2/12/2021  9:40 AM Ira Burr MD [facility] Impression: 1. Coronary artery disease involving native coronary artery of native heart with unstable angina pectoris ([facility] [address] [address] 2. Mixed hyperlipidemia 3. Essential hypertension Cardiac History: No specialty comments available. ROS-except as noted above. . A complete cardiac and respiratory are reviewed and negative except as above ; Resp-denies wheezing  or productive cough,. Const- No unusual weight loss or fever; Neuro-no recent seizure or CVA ; GI- No BRBPR, abdom pain, bloating ; - no  hematuria  
see supplement sheet, initialed and to be scanned by staff Past Medical History:  
Diagnosis Date  Acid indigestion  Arthritis 8/20/2012  Back pain  CAD (coronary artery disease) 2005 CABG X 3  
 Chronic pain BACK  Diabetes ([facility] [address] BORDERLINE NO MEDS CURRNTLY (7-15-20)  Enlarged prostate  GERD (gastroesophageal reflux disease) 8/20/2012  
 HTN (hypertension) 8/20/2012 NO MEDS CURRENTLY (7-15-20)  Hyperlipemia 8/20/2012  IH (inguinal hernia) 2/22/2015 2015, small right , reducible IH .  Ill-defined condition 12/2018 TIA, double vision  Insomnia  Stroke [facility]) 12/2018 TIA - H/O DOUBLE VISION  Thyroid disorder 8/20/2012 Social Hx= reports that he quit smoking about 41 years ago. His smoking use included cigarettes. He has a 10.00 pack-year smoking history. He has never used smokeless tobacco. He reports previous alcohol use. He reports that he does not use drugs. Exam and Labs:  /80 (BP 1 Location: Left arm, BP Patient Position: Sitting)   Pulse 80   Resp 18   Ht 5' 6\" (1.676 m)   Wt 182 lb (82.6 kg)   SpO2 99%   BMI 29.38 kg/m² Constitutional:  NAD, comfortable Head: NC,AT. Eyes: No scleral icterus. Neck:  Neck supple. No JVD present. Throat: moist mucous membranes. Chest: Effort normal & normal respiratory excursion . Neurological: alert, conversant and oriented . Skin: Skin is not cold. No obvious systemic rash noted. Not diaphoretic. No erythema. Psychiatric:  Grossly normal mood and affect. Behavior appears normal. Extremities:  no clubbing or cyanosis. Abdomen: non distended Lungs:breath sounds normal. No stridor. distress, wheezes or  Rales. Heart: normal rate, regular rhythm, normal S1, S2, no murmurs, rubs, clicks or gallops , PMI non displaced. Edema: Edema is none. Lab Results Component Value Date/Time Cholesterol, total 145 12/28/2018 03:13 AM  
 HDL Cholesterol 35 12/28/2018 03:13 AM  
 LDL, calculated 66.2 12/28/2018 03:13 AM  
 Triglyceride 219 (H) 12/28/2018 03:13 AM  
 CHOL/HDL Ratio 4.1 12/28/2018 03:13 AM  
 
Lab Results Component Value Date/Time Sodium 138 07/15/2020 02:04 PM  
 Potassium 4.2 07/15/2020 02:04 PM  
 Chloride 105 07/15/2020 02:04 PM  
 CO2 25 07/15/2020 02:04 PM  
 Anion gap 8 07/15/2020 02:04 PM  
 Glucose 115 (H) 07/15/2020 02:04 PM  
 BUN 17 07/15/2020 02:04 PM  
 Creatinine 0.91 07/15/2020 02:04 PM  
 BUN/Creatinine ratio 19 07/15/2020 02:04 PM  
 GFR est AA >60 07/15/2020 02:04 PM  
 GFR est non-AA >60 07/15/2020 02:04 PM  
 Calcium 8.4 (L) 07/15/2020 02:04 PM  
  
Wt Readings from Last 3 Encounters:  
01/08/21 182 lb (82.6 kg)  
11/19/20 182 lb (82.6 kg) 09/08/20 182 lb (82.6 kg) BP Readings from Last 3 Encounters:  
01/08/21 130/80  
11/19/20 130/70  
07/29/20 152/70 Current Outpatient Medications Medication Sig  
 lisinopriL (PRINIVIL, ZESTRIL) 5 mg tablet Take 1 Tab by mouth daily.  levothyroxine (SYNTHROID) 125 mcg tablet Take 1 Tab by mouth Daily (before breakfast).  tamsulosin (Flomax) 0.4 mg capsule Take 2 Caps by mouth daily.  pantoprazole (PROTONIX) 40 mg tablet Take 1 Tab by mouth daily.  atorvastatin (Lipitor) 20 mg tablet Take 1 Tab by mouth nightly. (Patient taking differently: Take 20 mg by mouth daily.)  acetaminophen (TYLENOL) 325 mg tablet Take 2 Tabs by mouth every four (4) hours as needed for Pain or Fever (For temp greater than or equal to 38.5 C or 101.3 F (Unless hepatic failure or contrindicated). Give first line for fever. ).  magnesium oxide (MAG-OX) 400 mg tablet Take 400 mg by mouth daily.  multivitamin (ONE A DAY) tablet Take 1 Tab by mouth daily.  omega-3 fatty acids-vitamin e (FISH OIL) 1,000 mg Cap Take 1 Cap by mouth daily.  clopidogreL (PLAVIX) 75 mg tab Take 1 Tab by mouth daily. No current facility-administered medications for this visit. Impression see above.   
  
Written by Reza Reyes, as dictated by Griselda Caceres MD.

## 2021-01-08 NOTE — PROGRESS NOTES
Visit Vitals  /80 (BP 1 Location: Left arm, BP Patient Position: Sitting)   Pulse 80   Resp 18   Ht 5' 6\" (1.676 m)   Wt 182 lb (82.6 kg)   SpO2 99%   BMI 29.38 kg/m²

## 2021-01-08 NOTE — Clinical Note
1/8/2021 Patient: Ting Lopez YOB: 1945 Date of Visit: 1/8/2021 Pina Willett MD 
[address] [address] [address] [employer] Dear Pina Willett MD, Thank you for referring Mr. Petra Brand to [facility] for evaluation. My notes for this consultation are attached. If you have questions, please do not hesitate to call me. I look forward to following your patient along with you.  
 
 
Sincerely, 
 
Vahid Alan MD

## 2021-01-19 RX ORDER — SODIUM CHLORIDE 0.9 % (FLUSH) 0.9 %
5-40 SYRINGE (ML) INJECTION AS NEEDED
Status: CANCELLED | OUTPATIENT
Start: 2021-01-19

## 2021-01-19 RX ORDER — SODIUM CHLORIDE 9 MG/ML
1000 INJECTION, SOLUTION INTRAVENOUS CONTINUOUS
Status: CANCELLED | OUTPATIENT
Start: 2021-01-19 | End: 2021-01-20

## 2021-01-19 RX ORDER — SODIUM CHLORIDE 0.9 % (FLUSH) 0.9 %
5-40 SYRINGE (ML) INJECTION EVERY 8 HOURS
Status: CANCELLED | OUTPATIENT
Start: 2021-01-19

## 2021-01-20 ENCOUNTER — HOSPITAL ENCOUNTER (OUTPATIENT)
Age: 76
Setting detail: OUTPATIENT SURGERY
Discharge: HOME | End: 2021-01-20
Attending: SPECIALIST | Admitting: SPECIALIST
Payer: MEDICARE

## 2021-01-20 VITALS
SYSTOLIC BLOOD PRESSURE: 143 MMHG | HEIGHT: 66 IN | RESPIRATION RATE: 20 BRPM | BODY MASS INDEX: 29.41 KG/M2 | DIASTOLIC BLOOD PRESSURE: 76 MMHG | HEART RATE: 88 BPM | TEMPERATURE: 97.7 F | OXYGEN SATURATION: 97 % | WEIGHT: 183 LBS

## 2021-01-20 DIAGNOSIS — I25.110 UNSTABLE ANGINA PECTORIS DUE TO CORONARY ARTERIOSCLEROSIS (HCC): ICD-10-CM

## 2021-01-20 PROCEDURE — 93459 L HRT ART/GRFT ANGIO: CPT | Performed by: SPECIALIST

## 2021-01-20 PROCEDURE — 74011000636 HC RX REV CODE- 636: Performed by: SPECIALIST

## 2021-01-20 PROCEDURE — 74011000250 HC RX REV CODE- 250: Performed by: SPECIALIST

## 2021-01-20 PROCEDURE — 99153 MOD SED SAME PHYS/QHP EA: CPT | Performed by: SPECIALIST

## 2021-01-20 PROCEDURE — 99152 MOD SED SAME PHYS/QHP 5/>YRS: CPT | Performed by: SPECIALIST

## 2021-01-20 PROCEDURE — 77030004532 HC CATH ANGI DX IMP BSC -A: Performed by: SPECIALIST

## 2021-01-20 PROCEDURE — 74011250636 HC RX REV CODE- 250/636: Performed by: SPECIALIST

## 2021-01-20 PROCEDURE — C1894 INTRO/SHEATH, NON-LASER: HCPCS | Performed by: SPECIALIST

## 2021-01-20 PROCEDURE — C1760 CLOSURE DEV, VASC: HCPCS | Performed by: SPECIALIST

## 2021-01-20 RX ORDER — SODIUM CHLORIDE 9 MG/ML
1000 INJECTION, SOLUTION INTRAVENOUS CONTINUOUS
Status: DISCONTINUED | OUTPATIENT
Start: 2021-01-20 | End: 2021-01-20 | Stop reason: HOSPADM

## 2021-01-20 RX ORDER — HEPARIN SODIUM 200 [USP'U]/100ML
INJECTION, SOLUTION INTRAVENOUS
Status: COMPLETED | OUTPATIENT
Start: 2021-01-20 | End: 2021-01-20

## 2021-01-20 RX ORDER — FENTANYL CITRATE 50 UG/ML
INJECTION, SOLUTION INTRAMUSCULAR; INTRAVENOUS AS NEEDED
Status: DISCONTINUED | OUTPATIENT
Start: 2021-01-20 | End: 2021-01-20 | Stop reason: HOSPADM

## 2021-01-20 RX ORDER — SODIUM CHLORIDE 0.9 % (FLUSH) 0.9 %
5-40 SYRINGE (ML) INJECTION EVERY 8 HOURS
Status: DISCONTINUED | OUTPATIENT
Start: 2021-01-20 | End: 2021-01-20 | Stop reason: HOSPADM

## 2021-01-20 RX ORDER — LIDOCAINE HYDROCHLORIDE 10 MG/ML
INJECTION INFILTRATION; PERINEURAL AS NEEDED
Status: DISCONTINUED | OUTPATIENT
Start: 2021-01-20 | End: 2021-01-20 | Stop reason: HOSPADM

## 2021-01-20 RX ORDER — SODIUM CHLORIDE 0.9 % (FLUSH) 0.9 %
5-40 SYRINGE (ML) INJECTION AS NEEDED
Status: DISCONTINUED | OUTPATIENT
Start: 2021-01-20 | End: 2021-01-20 | Stop reason: HOSPADM

## 2021-01-20 RX ORDER — MIDAZOLAM HYDROCHLORIDE 1 MG/ML
INJECTION, SOLUTION INTRAMUSCULAR; INTRAVENOUS AS NEEDED
Status: DISCONTINUED | OUTPATIENT
Start: 2021-01-20 | End: 2021-01-20 | Stop reason: HOSPADM

## 2021-01-20 RX ADMIN — SODIUM CHLORIDE 500 ML: 9 INJECTION, SOLUTION INTRAVENOUS at 12:24

## 2021-01-20 NOTE — PROGRESS NOTES
Cardiac Cath Lab Procedure Area Arrival Note:    Petra Brand arrived to Cardiac Cath Lab, Procedure Area. Patient identifiers verified with NAME and DATE OF BIRTH. Procedure verified with patient. Consent forms verified. Allergies verified. Patient informed of procedure and plan of care. Questions answered with review. Patient voiced understanding of procedure and plan of care. Patient on cardiac monitor, non-invasive blood pressure, SPO2 monitor. On  or O2 @ 2 lpm via NC.  IV of NS on pump at 75 ml/hr. Patient status doing well without problems. Patient is A&Ox 4. Patient reports no pain. Patient medicated during procedure with orders obtained and verified by Dr. Anum Lopez.    Refer to patients Cardiac Cath Lab PROCEDURE REPORT for vital signs, assessment, status, and response during procedure, printed at end of case. Printed report on chart or scanned into chart.

## 2021-01-20 NOTE — PROGRESS NOTES
Cardiac Cath Lab Recovery Arrival Note:      Petra Brand arrived to Cardiac Cath Lab, Recovery Area. Staff introduced to patient. Patient identifiers verified with NAME and DATE OF BIRTH. Procedure verified with patient. Consent forms reviewed and signed by patient or authorized representative and verified. Allergies verified. Patient and family oriented to department. Patient and family informed of procedure and plan of care. Questions answered with review. Patient prepped for procedure, per orders from physician, prior to arrival.    Patient on cardiac monitor, non-invasive blood pressure, SPO2 monitor. On Room Air. Patient is A&Ox 4. Patient reports No Pain. Patient in stretcher, in low position, with side rails up, call bell within reach, patient instructed to call if assistance as needed. Patient prep in: [address], [place]. Family in: Waiting Room.    Prep by: Brant Maxwell RN

## 2021-01-20 NOTE — PROGRESS NOTES
TRANSFER - OUT REPORT:    Verbal report given to Wanda Sanders RN on [employer] CRISTOPHER Brand being transferred to Cath Lab Recovery for routine progression of care       Report consisted of patients Situation, Background, Assessment and   Recommendations(SBAR). Information from the following report(s) SBAR, Procedure Summary and MAR was reviewed with the receiving nurse. Opportunity for questions and clarification was provided.

## 2021-01-20 NOTE — PROGRESS NOTES
Pt verbalized understanding of discharge instructions. PIV removed and guaze with tape placed; no redness or swelling at site. Pt escorted to car via wheelchair with belongings. Pt's friend is driving.

## 2021-01-20 NOTE — INTERVAL H&P NOTE
Update History & Physical 
 
The Patient's History and Physical of January 8th 2021 was reviewed with the patient and I examined the patient. There was no change. The surgical site was confirmed by the patient and me. Plan:  The risk, benefits, expected outcome, and alternative to the recommended procedure have been discussed with the patient. Patient understands and wants to proceed with the procedure.  
 
Electronically signed by Lucila Parr MD on 1/20/2021 at 1:52 PM

## 2021-01-20 NOTE — PROGRESS NOTES
TRANSFER - IN REPORT:    Verbal report received from [address] [place] [address] on [address] [facility]  being received from procedure area for routine progression of care. Report consisted of patients Situation, Background, Assessment and Recommendations(SBAR). Information from the following report(s) SBAR, Procedure Summary, MAR, Recent Results and Cardiac Rhythm NSR was reviewed with the receiving clinician. Opportunity for questions and clarification was provided. Assessment completed upon patients arrival to [address] [place] [address] and care assumed. Cardiac Cath Lab Recovery Arrival Note:    Petra Brand arrived to [facility] recovery area. Patient procedure= C. Patient on cardiac monitor, non-invasive blood pressure, SPO2 monitor. On Room Air. IV  of NS on pump at 75 ml/hr. Patient status doing well without problems. Patient is A&Ox 4. Patient reports No Pain. PROCEDURE SITE CHECK:    Procedure site:without any bleeding and No Hematoma, No pain/discomfort reported at procedure site. No change in patient status. Continue to monitor patient and status.

## 2021-02-26 ENCOUNTER — OFFICE VISIT (OUTPATIENT)
Dept: CARDIOLOGY CLINIC | Age: 76
End: 2021-02-26
Payer: MEDICARE

## 2021-02-26 VITALS
WEIGHT: 184 LBS | RESPIRATION RATE: 16 BRPM | SYSTOLIC BLOOD PRESSURE: 120 MMHG | DIASTOLIC BLOOD PRESSURE: 60 MMHG | HEIGHT: 66 IN | OXYGEN SATURATION: 97 % | HEART RATE: 95 BPM | BODY MASS INDEX: 29.57 KG/M2

## 2021-02-26 DIAGNOSIS — I10 ESSENTIAL HYPERTENSION: ICD-10-CM

## 2021-02-26 DIAGNOSIS — I25.110 CORONARY ARTERY DISEASE INVOLVING NATIVE CORONARY ARTERY OF NATIVE HEART WITH UNSTABLE ANGINA PECTORIS (HCC): Primary | ICD-10-CM

## 2021-02-26 DIAGNOSIS — E78.2 MIXED HYPERLIPIDEMIA: ICD-10-CM

## 2021-02-26 PROCEDURE — 99214 OFFICE O/P EST MOD 30 MIN: CPT | Performed by: SPECIALIST

## 2021-02-26 PROCEDURE — G8536 NO DOC ELDER MAL SCRN: HCPCS | Performed by: SPECIALIST

## 2021-02-26 PROCEDURE — G8417 CALC BMI ABV UP PARAM F/U: HCPCS | Performed by: SPECIALIST

## 2021-02-26 PROCEDURE — G8754 DIAS BP LESS 90: HCPCS | Performed by: SPECIALIST

## 2021-02-26 PROCEDURE — G8510 SCR DEP NEG, NO PLAN REQD: HCPCS | Performed by: SPECIALIST

## 2021-02-26 PROCEDURE — 93000 ELECTROCARDIOGRAM COMPLETE: CPT | Performed by: SPECIALIST

## 2021-02-26 PROCEDURE — 1101F PT FALLS ASSESS-DOCD LE1/YR: CPT | Performed by: SPECIALIST

## 2021-02-26 PROCEDURE — G9711 PT HX TOT COL OR COLON CA: HCPCS | Performed by: SPECIALIST

## 2021-02-26 PROCEDURE — G8752 SYS BP LESS 140: HCPCS | Performed by: SPECIALIST

## 2021-02-26 PROCEDURE — G8427 DOCREV CUR MEDS BY ELIG CLIN: HCPCS | Performed by: SPECIALIST

## 2021-02-26 NOTE — PROGRESS NOTES
Visit Vitals  /60 (BP 1 Location: Left arm, BP Patient Position: Sitting, BP Cuff Size: Adult)   Pulse 95   Resp 16   Ht 5' 6\" (1.676 m)   Wt 184 lb (83.5 kg)   SpO2 97%   BMI 29.70 kg/m²

## 2021-02-26 NOTE — Clinical Note
2/26/2021 Patient: Gabrielle Germain YOB: 1945 Date of Visit: 2/26/2021 Chasity Henry MD 
[address] [id number] [address] [employer] Dear Chasity Henry MD, Thank you for referring Mr. Petra Brand to [facility] for evaluation. My notes for this consultation are attached. If you have questions, please do not hesitate to call me. I look forward to following your patient along with you.  
 
 
Sincerely, 
 
Shoaib Verdugo MD

## 2021-03-10 ENCOUNTER — TELEPHONE (OUTPATIENT)
Dept: CARDIOLOGY CLINIC | Age: 76
End: 2021-03-10

## 2021-03-10 NOTE — TELEPHONE ENCOUNTER
Patient states that he has stopped atorvastatin and sometimes there's  numbness in his fingers.     Phone: [phone]

## 2021-03-10 NOTE — TELEPHONE ENCOUNTER
Verified patient with two types of identifiers. Patient reports no real change in symptoms and is restarting Lipitor today.  Will update MD.

## 2021-03-11 NOTE — TELEPHONE ENCOUNTER
Yes restart statin and keep usual follow up  Its likely mid . neuropathy     Future Appointments   Date Time Provider Amy House   9/17/2021 10:20 AM MD IRMA Doherty AMB

## 2021-10-07 ENCOUNTER — OFFICE VISIT (OUTPATIENT)
Dept: CARDIOLOGY CLINIC | Age: 76
End: 2021-10-07
Payer: MEDICARE

## 2021-10-07 VITALS
HEIGHT: 66 IN | OXYGEN SATURATION: 97 % | SYSTOLIC BLOOD PRESSURE: 130 MMHG | HEART RATE: 81 BPM | WEIGHT: 184 LBS | BODY MASS INDEX: 29.57 KG/M2 | DIASTOLIC BLOOD PRESSURE: 60 MMHG | RESPIRATION RATE: 16 BRPM

## 2021-10-07 DIAGNOSIS — E78.2 MIXED HYPERLIPIDEMIA: ICD-10-CM

## 2021-10-07 DIAGNOSIS — I10 ESSENTIAL HYPERTENSION: ICD-10-CM

## 2021-10-07 DIAGNOSIS — I25.118 CORONARY ARTERY DISEASE OF NATIVE ARTERY OF NATIVE HEART WITH STABLE ANGINA PECTORIS (HCC): Primary | ICD-10-CM

## 2021-10-07 PROCEDURE — G8427 DOCREV CUR MEDS BY ELIG CLIN: HCPCS | Performed by: SPECIALIST

## 2021-10-07 PROCEDURE — G8752 SYS BP LESS 140: HCPCS | Performed by: SPECIALIST

## 2021-10-07 PROCEDURE — G8754 DIAS BP LESS 90: HCPCS | Performed by: SPECIALIST

## 2021-10-07 PROCEDURE — G9711 PT HX TOT COL OR COLON CA: HCPCS | Performed by: SPECIALIST

## 2021-10-07 PROCEDURE — 99214 OFFICE O/P EST MOD 30 MIN: CPT | Performed by: SPECIALIST

## 2021-10-07 PROCEDURE — 1101F PT FALLS ASSESS-DOCD LE1/YR: CPT | Performed by: SPECIALIST

## 2021-10-07 PROCEDURE — 93000 ELECTROCARDIOGRAM COMPLETE: CPT | Performed by: SPECIALIST

## 2021-10-07 PROCEDURE — G8419 CALC BMI OUT NRM PARAM NOF/U: HCPCS | Performed by: SPECIALIST

## 2021-10-07 PROCEDURE — G8510 SCR DEP NEG, NO PLAN REQD: HCPCS | Performed by: SPECIALIST

## 2021-10-07 PROCEDURE — G8536 NO DOC ELDER MAL SCRN: HCPCS | Performed by: SPECIALIST

## 2021-10-07 NOTE — Clinical Note
10/7/2021    Patient: Bianca Trejo   YOB: 1945   Date of Visit: 10/7/2021     Randy Mathis MD  [address] [place] [address]    Dear Randy Mathis MD,      Thank you for referring Mr. Petra Brand to [facility] for evaluation. My notes for this consultation are attached. If you have questions, please do not hesitate to call me. I look forward to following your patient along with you.       Sincerely,    Karma Hawkins MD

## 2021-10-07 NOTE — PATIENT INSTRUCTIONS
Please have your fasting blood work completed. We will call you with results and see you back for an annual follow up with a stress echocardiogram the same day. Stress echo:    Please arrive 15 minutes prior to your appointment. Wear comfortable clothes and shoes.  Please do not eat or drink anything other than water two hours prior to test.    [facility] [place] Draw Site:  [place] , [address] [place] [address]  Phone: [phone]  Monday- Friday 7:00 am- 5:00 pm

## 2021-10-07 NOTE — PROGRESS NOTES
Petra Brand     1945       Ira Darby MD, [facility]  Date of Visit-10/7/2021   PCP is Fredi Frost MD   [facility] [place]  [facility]  HPI:  Sridhar Hill is a 78 y.o. male   8 month f/u post cath. · Hx of CABG in 2006. · Prior TIA on Plavix. · He saw Dr. Payal Hobson until 2015, though the pt had told the surgeon he had not seen a cardiologist in 10 years. Our notes indicate 3 vessel CABG July 2005. · Cath in August 2009 with open [address] to LAD, open SVG to PDA, but the PDA was closed at the insertion site, open SVG to OM1. · Echo 12/28/18 EF 65-70%.   ·  lipoma resection in July.   · Pt underwent cath 1/20/21 all grafts were open and LVEDP was 16.     Today. .. Pt had cath in January if this year and showed open grafts, and Atorvastatin to see if improved hand numbness. Pt states that he has occasional CP that only lasts a couple seconds, but then goes back to normal energy level. He notes that it is very infrequent, having one episode once a month that is brought about by heavy exertion. He has mild SOLER but not at short distances. He has some dizziness and tachycardia occasionally  Denies edema, syncope or shortness of breath at rest, has no palpitations or sense of arrhythmia. EKG: Sinus  Rhythm -Left atrial enlargement. Heart rate 81   QRS 86    Assessment/Plan:    1. Coronary artery disease involving native coronary artery of native heart with stable angina pectoris ([address]  Cath showed open grafts, pain once a month only with overexertion and seems to be doing well on current meds, will f/u one year with stress echo. 2. Mixed hyperlipidemia  Check lipids. At goal , denies excess muscle aches or new liver issues  Key Antihyperlipidemia Meds             atorvastatin (LIPITOR) 20 mg tablet (Taking) TAKE 1 TABLET EVERY NIGHT    omega-3 fatty acids-vitamin e (FISH OIL) 1,000 mg Cap (Taking) Take 1 Cap by mouth daily. Lab Results   Component Value Date/Time    LDL, calculated 23.2 10/08/2021 08:45 AM       3. Essential hypertension  Stable. At goal , meds and possible side effects reviewed and patient denies  Key CAD CHF Meds             atorvastatin (LIPITOR) 20 mg tablet (Taking) TAKE 1 TABLET EVERY NIGHT    lisinopriL (PRINIVIL, ZESTRIL) 5 mg tablet (Taking) Take 1 Tablet by mouth daily. nitroglycerin (NITROSTAT) 0.4 mg SL tablet (Taking) 1 Tab by SubLINGual route every five (5) minutes as needed for Chest Pain. Do not exceed 3 in 24 hours. omega-3 fatty acids-vitamin e (FISH OIL) 1,000 mg Cap (Taking) Take 1 Cap by mouth daily. BP Readings from Last 6 Encounters:   10/07/21 130/60   09/08/21 (!) 144/70   02/26/21 120/60   02/11/21 130/70   01/26/21 128/70   01/20/21 (!) 143/76          F/u in 1 year   Patient Instructions   Please have your fasting blood work completed. We will call you with results and see you back for an annual follow up with a stress echocardiogram the same day. Stress echo:    Please arrive 15 minutes prior to your appointment. Wear comfortable clothes and shoes. Please do not eat or drink anything other than water two hours prior to test.    [facility] [place] Draw Site:  [address] [address], [address],   [facility], [address]  Phone: [phone]  Monday- Friday 7:00 am- 5:00 pm         Impression:   1. Coronary artery disease involving native coronary artery of native heart with unstable angina pectoris ([address]    2. Mixed hyperlipidemia    3. Essential hypertension       Cardiac History:   No specialty comments available. Future Appointments   Date Time Provider Amy House   10/6/2022 10:00 AM SWATHI YATES AMB   10/6/2022 10:00 AM STRESSECHSWATHI YO AMB   10/6/2022 11:00 AM Ira Burr MD CAVREY BS AMB        ROS-except as noted above. . A complete cardiac and respiratory are reviewed and negative except as above ; Resp-denies wheezing  or productive cough,. Const- No unusual weight loss or fever; Neuro-no recent seizure or CVA ; GI- No BRBPR, abdom pain, bloating ; - no  hematuria   see supplement sheet, initialed and to be scanned by staff  Past Medical History:   Diagnosis Date    Acid indigestion     Arthritis 8/20/2012    Back pain     CAD (coronary artery disease) 2005    CABG X 3    Chronic pain     BACK    Diabetes ([facility]     BORDERLINE NO MEDS CURRNTLY (7-15-20)    Enlarged prostate     GERD (gastroesophageal reflux disease) 8/20/2012    HTN (hypertension) 8/20/2012    NO MEDS CURRENTLY (7-15-20)    Hyperlipemia 8/20/2012    IH (inguinal hernia) 2/22/2015 2015, small right , reducible IH .  Ill-defined condition 12/2018    TIA, double vision    Insomnia     Stroke ([facility] 12/2018    TIA - H/O DOUBLE VISION    Thyroid disorder 8/20/2012      Social Hx= reports that he quit smoking about 41 years ago. His smoking use included cigarettes. He has a 10.00 pack-year smoking history. He has never used smokeless tobacco. He reports previous alcohol use. He reports that he does not use drugs. Exam and Labs:  /60   Pulse 81   Resp 16   Ht 5' 6\" (1.676 m)   Wt 184 lb (83.5 kg)   SpO2 97%   BMI 29.70 kg/m² Constitutional:  NAD, comfortable  Head: NC,AT. Eyes: No scleral icterus. Neck:  Neck supple. No JVD present. Throat: moist mucous membranes. Chest: Effort normal & normal respiratory excursion . Neurological: alert, conversant and oriented . Skin: Skin is not cold. No obvious systemic rash noted. Not diaphoretic. No erythema. Psychiatric:  Grossly normal mood and affect. Behavior appears normal. Extremities:  no clubbing or cyanosis. Abdomen: non distended    Lungs:breath sounds normal. No stridor. distress, wheezes or  Rales. Heart: normal rate, regular rhythm, normal S1, S2, no murmurs, rubs, clicks or gallops , PMI non displaced. Edema: Edema is none.   Lab Results   Component Value Date/Time Cholesterol, total 145 12/28/2018 03:13 AM    HDL Cholesterol 35 12/28/2018 03:13 AM    LDL, calculated 66.2 12/28/2018 03:13 AM    Triglyceride 219 (H) 12/28/2018 03:13 AM    CHOL/HDL Ratio 4.1 12/28/2018 03:13 AM     Lab Results   Component Value Date/Time    Sodium 137 01/18/2021 08:29 AM    Potassium 4.3 01/18/2021 08:29 AM    Chloride 102 01/18/2021 08:29 AM    CO2 32 01/18/2021 08:29 AM    Anion gap 3 (L) 01/18/2021 08:29 AM    Glucose 139 (H) 01/18/2021 08:29 AM    BUN 13 01/18/2021 08:29 AM    Creatinine 0.83 01/18/2021 08:29 AM    BUN/Creatinine ratio 16 01/18/2021 08:29 AM    GFR est AA >60 01/18/2021 08:29 AM    GFR est non-AA >60 01/18/2021 08:29 AM    Calcium 8.9 01/18/2021 08:29 AM      Wt Readings from Last 3 Encounters:   10/07/21 184 lb (83.5 kg)   09/08/21 183 lb (83 kg)   02/26/21 184 lb (83.5 kg)      BP Readings from Last 3 Encounters:   10/07/21 130/60   09/08/21 (!) 144/70   02/26/21 120/60      Current Outpatient Medications   Medication Sig    atorvastatin (LIPITOR) 20 mg tablet TAKE 1 TABLET EVERY NIGHT    lisinopriL (PRINIVIL, ZESTRIL) 5 mg tablet Take 1 Tablet by mouth daily.  levothyroxine (SYNTHROID) 125 mcg tablet Take 1 Tablet by mouth Daily (before breakfast).  pantoprazole (PROTONIX) 40 mg tablet Take 1 Tablet by mouth daily.  tamsulosin (Flomax) 0.4 mg capsule Take 2 Capsules by mouth daily.  meloxicam (MOBIC) 15 mg tablet Take 1 Tablet by mouth daily for 90 days.  nitroglycerin (NITROSTAT) 0.4 mg SL tablet 1 Tab by SubLINGual route every five (5) minutes as needed for Chest Pain. Do not exceed 3 in 24 hours.  acetaminophen (TYLENOL) 325 mg tablet Take 2 Tabs by mouth every four (4) hours as needed for Pain or Fever (For temp greater than or equal to 38.5 C or 101.3 F (Unless hepatic failure or contrindicated). Give first line for fever. ).  magnesium oxide (MAG-OX) 400 mg tablet Take 400 mg by mouth daily.     multivitamin (ONE A DAY) tablet Take 1 Tab by mouth daily.  omega-3 fatty acids-vitamin e (FISH OIL) 1,000 mg Cap Take 1 Cap by mouth daily. No current facility-administered medications for this visit. Impression see above.       Written by Diego Parikh, as dictated by Jovon Mcduffie MD.

## 2022-03-18 PROBLEM — D17.9 MULTIPLE LIPOMAS: Status: ACTIVE | Noted: 2020-07-08

## 2022-03-19 PROBLEM — D12.0 ADENOMATOUS POLYP OF CECUM: Status: ACTIVE | Noted: 2020-01-10

## 2022-03-19 PROBLEM — H53.2 DOUBLE VISION: Status: ACTIVE | Noted: 2018-12-27

## 2022-10-05 ENCOUNTER — APPOINTMENT (OUTPATIENT)
Dept: CARDIOLOGY CLINIC | Age: 77
End: 2022-10-05
Payer: MEDICARE

## 2022-10-05 ENCOUNTER — ANCILLARY PROCEDURE (OUTPATIENT)
Dept: CARDIOLOGY CLINIC | Age: 77
End: 2022-10-05

## 2022-10-05 VITALS
SYSTOLIC BLOOD PRESSURE: 136 MMHG | WEIGHT: 184 LBS | DIASTOLIC BLOOD PRESSURE: 56 MMHG | HEIGHT: 66 IN | BODY MASS INDEX: 29.57 KG/M2

## 2022-10-05 DIAGNOSIS — I25.10 CORONARY ARTERY DISEASE INVOLVING NATIVE CORONARY ARTERY OF NATIVE HEART, UNSPECIFIED WHETHER ANGINA PRESENT: ICD-10-CM

## 2022-10-05 PROCEDURE — 93351 STRESS TTE COMPLETE: CPT | Performed by: SPECIALIST

## 2022-10-13 LAB
STRESS ANGINA INDEX: 2
STRESS BASELINE DIAS BP: 56 MMHG
STRESS BASELINE HR: 99 BPM
STRESS BASELINE ST DEPRESSION: 0 MM
STRESS BASELINE SYS BP: 136 MMHG
STRESS ESTIMATED WORKLOAD: 6 METS
STRESS EXERCISE DUR MIN: 3 MIN
STRESS EXERCISE DUR SEC: 35 SEC
STRESS O2 SAT PEAK: 97 %
STRESS O2 SAT REST: 96 %
STRESS PEAK DIAS BP: 82 MMHG
STRESS PEAK SYS BP: 160 MMHG
STRESS PERCENT HR ACHIEVED: 107 %
STRESS POST PEAK HR: 155 BPM
STRESS RATE PRESSURE PRODUCT: NORMAL BPM*MMHG
STRESS TARGET HR: 145 BPM

## 2022-10-21 ENCOUNTER — TELEPHONE (OUTPATIENT)
Dept: CARDIOLOGY CLINIC | Age: 77
End: 2022-10-21

## 2022-10-21 NOTE — TELEPHONE ENCOUNTER
Received fax from [place] [address] Dr. Yosvany Moore office requesting cardiac clearance prior to multiple extractions under local.     Fax: [phone]

## 2022-10-25 NOTE — TELEPHONE ENCOUNTER
Nola Lesches, MD  You 14 hours ago (11:11 PM)     VS  I have no objection to the planned  surgery. Patient seems to be at a low risk for sony-operative severe adverse cardiac events. Form done   Has follow up to review chest pain but does not need to delay dental work as it is low risk procedure      Faxed clearance form. Received confirmation.

## 2022-11-02 NOTE — PROGRESS NOTES
Upcoming visit to review results  Future Appointments  11/16/2022 9:20 AM    Ira Burr MD CAVREY BS AMB

## 2022-11-16 ENCOUNTER — OFFICE VISIT (OUTPATIENT)
Dept: CARDIOLOGY CLINIC | Age: 77
End: 2022-11-16
Payer: MEDICARE

## 2022-11-16 VITALS
OXYGEN SATURATION: 98 % | HEART RATE: 110 BPM | SYSTOLIC BLOOD PRESSURE: 136 MMHG | RESPIRATION RATE: 18 BRPM | BODY MASS INDEX: 29.09 KG/M2 | HEIGHT: 66 IN | WEIGHT: 181 LBS | DIASTOLIC BLOOD PRESSURE: 80 MMHG

## 2022-11-16 DIAGNOSIS — E78.2 MIXED HYPERLIPIDEMIA: ICD-10-CM

## 2022-11-16 DIAGNOSIS — R00.0 TACHYCARDIA: ICD-10-CM

## 2022-11-16 DIAGNOSIS — I25.118 CORONARY ARTERY DISEASE OF NATIVE ARTERY OF NATIVE HEART WITH STABLE ANGINA PECTORIS (HCC): Primary | ICD-10-CM

## 2022-11-16 DIAGNOSIS — I10 ESSENTIAL HYPERTENSION: ICD-10-CM

## 2022-11-16 PROCEDURE — G9711 PT HX TOT COL OR COLON CA: HCPCS | Performed by: SPECIALIST

## 2022-11-16 PROCEDURE — G8510 SCR DEP NEG, NO PLAN REQD: HCPCS | Performed by: SPECIALIST

## 2022-11-16 PROCEDURE — 99214 OFFICE O/P EST MOD 30 MIN: CPT | Performed by: SPECIALIST

## 2022-11-16 PROCEDURE — 1101F PT FALLS ASSESS-DOCD LE1/YR: CPT | Performed by: SPECIALIST

## 2022-11-16 PROCEDURE — G8754 DIAS BP LESS 90: HCPCS | Performed by: SPECIALIST

## 2022-11-16 PROCEDURE — 3074F SYST BP LT 130 MM HG: CPT | Performed by: SPECIALIST

## 2022-11-16 PROCEDURE — 93000 ELECTROCARDIOGRAM COMPLETE: CPT | Performed by: SPECIALIST

## 2022-11-16 PROCEDURE — G8417 CALC BMI ABV UP PARAM F/U: HCPCS | Performed by: SPECIALIST

## 2022-11-16 PROCEDURE — 1123F ACP DISCUSS/DSCN MKR DOCD: CPT | Performed by: SPECIALIST

## 2022-11-16 PROCEDURE — G8427 DOCREV CUR MEDS BY ELIG CLIN: HCPCS | Performed by: SPECIALIST

## 2022-11-16 PROCEDURE — G8536 NO DOC ELDER MAL SCRN: HCPCS | Performed by: SPECIALIST

## 2022-11-16 PROCEDURE — 3078F DIAST BP <80 MM HG: CPT | Performed by: SPECIALIST

## 2022-11-16 PROCEDURE — G8752 SYS BP LESS 140: HCPCS | Performed by: SPECIALIST

## 2022-11-16 NOTE — PATIENT INSTRUCTIONS
A 7 day holter monitor has been ordered for you. Please come back to the office as scheduled to have it placed. Please have your labs completed at the [facility].

## 2022-11-16 NOTE — LETTER
11/16/2022    Patient: Joey Leblanc   YOB: 1945   Date of Visit: 11/16/2022     Jason Howard MD  [address]  [place]    Dear Jason Howard MD,      Kristal Uriostegui, overall he seems to be doing well except for the broken arm. He did incidentally mention that he notices tachycardia on his home monitor and I will do  a 7 Day Loop mmnoitor. His blood pressure stable and, recheck lipids and blood work. As you know you already got a normal thyroid recently. No changes in medication. We did a stress echo in the past month which was normal.  I will see him back in 6 to 12 months depending on his tachycardia.     Sincerely,    Collin Nicole MD

## 2022-11-16 NOTE — PROGRESS NOTES
Petra Brand     1945       Ira Cobos MD, [facility]  Date of Visit-11/16/2022   PCP is La Love MD   [facility] [place]  [facility]  HPI:  Kyara Christian is a 78 y.o. male   One year f/u  Hx of CABG in 2005. Prior TIA on Plavix. He saw Dr. Tulio Brennan until 2015, though the pt had told the surgeon he had not seen a cardiologist in 10 years. Our notes indicate 3 vessel CABG July 2005. Cath in August 2009 with open [address] to LAD, open SVG to PDA, but the PDA was closed at the insertion site, open SVG to OM1. Echo 12/28/18 EF 65-70%. lipoma resection in July. Pt underwent cath 1/20/21 all grafts were open and LVEDP was 16. Today. .. Had lab work with PCP on 10/28/2022 with normal thyroid and had fracture of the left humerus seen by orthopedics  Previous telephone call 10/21/2022 to approve extractions dental done at [address] [place] [address]  Denies chest pain, edema, syncope or shortness of breath at rest   He fell and that is how he hurt his humerus when he was at [employer] and moving a round table. Incidentally at the end of the visit he mentioned that his heart rate sometimes goes to 120-130  on his home blood pressure cuff. He insists its not that blood pressure but the heart rate. Blood pressure runs 130-1 40. He does not feel a necessarily a sense of tachycardia. His last lab work for lipids was with us last year and we will plan to repeat at his request.  Recent stress echo 10/5/2022 is normal at 3'35\"  Test reviewed and reassuring     EKG- Sinus  Rhythm   -Left atrial enlargement.    -Nonspecific ST depression  -Nondiagnostic. Assessment/Plan:    Patient Instructions   A 7 day holter monitor has been ordered for you. Please come back to the office as scheduled to have it placed. Please have your labs completed at the [facility].        1. Coronary artery disease involving native coronary artery of native heart with stable angina pectoris [facility])  CABG 2005  Cath 1/2021 showed open grafts  No angina  BRIEN normal    2. Mixed hyperlipidemia  At goal , denies excess muscle aches or new liver issues  Check lipids with CMP lipid profile and since he has tachycardia we will add a CBC. He  Key Antihyperlipidemia Meds               atorvastatin (LIPITOR) 20 mg tablet (Taking) TAKE 1 TABLET EVERY NIGHT    omega-3 fatty acids-vitamin e 1,000 mg cap (Taking) Take 1 Cap by mouth daily. Lab Results   Component Value Date/Time    Cholesterol, total 98 10/08/2021 08:45 AM    Cholesterol (POC) 118 08/10/2017 09:23 AM    HDL Cholesterol 46 10/08/2021 08:45 AM    HDL Cholesterol (POC) 36 08/10/2017 09:23 AM    LDL Cholesterol (POC) 55 08/10/2017 09:23 AM    LDL, calculated 23.2 10/08/2021 08:45 AM    VLDL, calculated 28.8 10/08/2021 08:45 AM    Triglyceride 144 10/08/2021 08:45 AM    Triglycerides (POC) 135 08/10/2017 09:23 AM    CHOL/HDL Ratio 2.1 10/08/2021 08:45 AM        3. Essential hypertension  Stable. At goal , meds and possible side effects reviewed and patient denies  Key CAD CHF Meds               lisinopriL (PRINIVIL, ZESTRIL) 5 mg tablet (Taking) TAKE 1 TABLET EVERY DAY    atorvastatin (LIPITOR) 20 mg tablet (Taking) TAKE 1 TABLET EVERY NIGHT    nitroglycerin (NITROSTAT) 0.4 mg SL tablet (Taking) 1 Tab by SubLINGual route every five (5) minutes as needed for Chest Pain. Do not exceed 3 in 24 hours. omega-3 fatty acids-vitamin e 1,000 mg cap (Taking) Take 1 Cap by mouth daily. BP Readings from Last 6 Encounters:   11/16/22 136/80   10/05/22 (!) 136/56   10/07/21 130/60   09/08/21 (!) 144/70   02/26/21 120/60   02/11/21 130/70        4. Tachycardia  He notes this tachycardia on his home blood pressure cuff but does not necessarily feel a sense of tachycardia and insists he is not getting confused with the blood pressure value.   I will put a 7 Day Loop monitor   he has already had a normal TSH with Dr. Jax Tipton, overall he seems to be doing well except for the broken arm. He did incidentally mention that he notices tachycardia on his home monitor and I will do  a 7 Day Loop mmnoitor. His blood pressure stable and, recheck lipids and blood work. As you know you already got a normal thyroid recently. No changes in medication. We did a stress echo in the past month which was normal.  I will see him back in 6 to 12 months depending on his tachycardia. Impression:   1. Coronary artery disease of native artery of native heart with stable angina pectoris ([address]    2. Mixed hyperlipidemia    3. Essential hypertension    4. Tachycardia       Cardiac History:   No specialty comments available. Future Appointments   Date Time Provider Amy House   11/30/2022 10:00 AM HOLTER, REYNOLDS CAVREY BS AMB   5/9/2023 10:20 AM Ira Burr, MD IRMA CARMONA AMB          ROS-except as noted above. . A complete cardiac and respiratory are reviewed and negative except as above ; Resp-denies wheezing  or productive cough,. Const- No unusual weight loss or fever; Neuro-no recent seizure or CVA ; GI- No BRBPR, abdom pain, bloating ; - no  hematuria   see supplement sheet, initialed and to be scanned by staff  Past Medical History:   Diagnosis Date    Acid indigestion     Arthritis 8/20/2012    Back pain     CAD (coronary artery disease) 2005    CABG X 3    Chronic pain     BACK    Diabetes ([address]     BORDERLINE NO MEDS CURRNTLY (7-15-20)    Enlarged prostate     GERD (gastroesophageal reflux disease) 8/20/2012    HTN (hypertension) 8/20/2012    NO MEDS CURRENTLY (7-15-20)    Hyperlipemia 8/20/2012    IH (inguinal hernia) 2/22/2015    2015, small right , reducible IH . Ill-defined condition 12/2018    TIA, double vision    Insomnia     Stroke ([address] 12/2018    TIA - H/O DOUBLE VISION    Thyroid disorder 8/20/2012      Social Hx= reports that he quit smoking about 42 years ago. His smoking use included cigarettes.  He has a 10.00 pack-year smoking history. He has never used smokeless tobacco. He reports that he does not currently use alcohol. He reports that he does not use drugs. Exam and Labs:  /80 (BP 1 Location: Right arm)   Pulse (!) 110   Resp 18   Ht 5' 6\" (1.676 m)   Wt 181 lb (82.1 kg)   SpO2 98%   BMI 29.21 kg/m² Constitutional:  NAD, comfortable  Head: NC,AT. Eyes: No scleral icterus. Neck:  Neck supple. No JVD present. Throat: moist mucous membranes. Chest: Effort normal & normal respiratory excursion . Neurological: alert, conversant and oriented . Skin: Skin is not cold. No obvious systemic rash noted. Not diaphoretic. No erythema. Psychiatric:  Grossly normal mood and affect. Behavior appears normal. Extremities:  no clubbing or cyanosis. Abdomen: non distended    Lungs:breath sounds normal. No stridor. distress, wheezes or  Rales. Heart: normal rate, regular rhythm, normal S1, S2, no murmurs, rubs, clicks or gallops , PMI non displaced. Edema: Edema is none.   Lab Results   Component Value Date/Time    Cholesterol, total 98 10/08/2021 08:45 AM    HDL Cholesterol 46 10/08/2021 08:45 AM    LDL, calculated 23.2 10/08/2021 08:45 AM    Triglyceride 144 10/08/2021 08:45 AM    CHOL/HDL Ratio 2.1 10/08/2021 08:45 AM     Lab Results   Component Value Date/Time    Sodium 136 10/08/2021 08:45 AM    Potassium 4.6 10/08/2021 08:45 AM    Chloride 104 10/08/2021 08:45 AM    CO2 29 10/08/2021 08:45 AM    Anion gap 3 (L) 10/08/2021 08:45 AM    Glucose 92 10/08/2021 08:45 AM    BUN 17 10/08/2021 08:45 AM    Creatinine 0.78 10/08/2021 08:45 AM    BUN/Creatinine ratio 22 (H) 10/08/2021 08:45 AM    GFR est AA >60 10/08/2021 08:45 AM    GFR est non-AA >60 10/08/2021 08:45 AM    Calcium 8.9 10/08/2021 08:45 AM      Wt Readings from Last 3 Encounters:   11/16/22 181 lb (82.1 kg)   10/05/22 184 lb (83.5 kg)   10/07/21 184 lb (83.5 kg)      BP Readings from Last 3 Encounters:   11/16/22 136/80   10/05/22 (!) 136/56   10/07/21 130/60 Current Outpatient Medications   Medication Sig    levothyroxine (SYNTHROID) 125 mcg tablet TAKE 1 TABLET BY MOUTH DAILY (BEFORE BREAKFAST). pantoprazole (PROTONIX) 40 mg tablet TAKE 1 TABLET EVERY DAY    lisinopriL (PRINIVIL, ZESTRIL) 5 mg tablet TAKE 1 TABLET EVERY DAY    tamsulosin (FLOMAX) 0.4 mg capsule TAKE 2 CAPSULES BY MOUTH DAILY. meloxicam (MOBIC) 15 mg tablet TAKE 1 TABLET EVERY DAY    atorvastatin (LIPITOR) 20 mg tablet TAKE 1 TABLET EVERY NIGHT    nitroglycerin (NITROSTAT) 0.4 mg SL tablet 1 Tab by SubLINGual route every five (5) minutes as needed for Chest Pain. Do not exceed 3 in 24 hours. acetaminophen (TYLENOL) 325 mg tablet Take 2 Tabs by mouth every four (4) hours as needed for Pain or Fever (For temp greater than or equal to 38.5 C or 101.3 F (Unless hepatic failure or contrindicated). Give first line for fever. ).    magnesium oxide (MAG-OX) 400 mg tablet Take 400 mg by mouth daily. multivitamin (ONE A DAY) tablet Take 1 Tab by mouth daily. omega-3 fatty acids-vitamin e 1,000 mg cap Take 1 Cap by mouth daily. No current facility-administered medications for this visit. Impression see above.

## 2022-11-30 ENCOUNTER — CLINICAL SUPPORT (OUTPATIENT)
Dept: CARDIOLOGY CLINIC | Age: 77
End: 2022-11-30
Payer: MEDICARE

## 2022-11-30 DIAGNOSIS — R00.0 TACHYCARDIA: Primary | ICD-10-CM

## 2022-12-23 ENCOUNTER — TELEPHONE (OUTPATIENT)
Dept: CARDIOLOGY CLINIC | Age: 77
End: 2022-12-23

## 2023-01-03 ENCOUNTER — PATIENT MESSAGE (OUTPATIENT)
Dept: CARDIOLOGY CLINIC | Age: 78
End: 2023-01-03

## 2023-01-04 ENCOUNTER — TELEPHONE (OUTPATIENT)
Dept: CARDIOLOGY CLINIC | Age: 78
End: 2023-01-04

## 2023-01-04 RX ORDER — ATENOLOL 50 MG/1
50 TABLET ORAL DAILY
Qty: 90 TABLET | Refills: 1 | Status: SHIPPED | OUTPATIENT
Start: 2023-01-04

## 2023-01-04 NOTE — TELEPHONE ENCOUNTER
Communicated with patient via [address] Requested Prescriptions     Signed Prescriptions Disp Refills    atenoloL (TENORMIN) 50 mg tablet 90 Tablet 1     Sig: Take 1 Tablet by mouth daily.      Authorizing Provider: Judit Cisneros     Ordering User: Doreen Murillo     Written order per Dr. Monica Telles.

## 2023-01-04 NOTE — TELEPHONE ENCOUNTER
Holter /Loop monitor  Duration/Dates: Seven day 11/30/22- 12/6/22  Average heart rate:89 bpm- 79% controlled, 20% tachycardia  Findings:  No arrhythmia of significance  Rare PVCs, PACs, <0.02% for both  No pause , block of AFib  SVT x 3 , fastest at 146  One patient event -benign SR at 86    Overall there is some higher HR and probably best to start a beta blocker   Put on Atenolol 50 mg daily ,let us know how he is doing with it after a week or two  Note labs in November for Chem, CBC were all fine  LDL was up a bit , make sure working on good diet and compliant with statin-goal is LDL <70  Overall Labs were fine     Future Appointments   Date Time Provider Amy House   7/10/2023 10:20 AM Nola Lesches, MD [facility]        Lab Results   Component Value Date/Time    LDL, calculated 87.6 11/17/2022 08:31 AM          Lab Results   Component Value Date/Time    TSH 0.906 10/28/2022 10:30 AM        Current Outpatient Medications:     levothyroxine (SYNTHROID) 125 mcg tablet, TAKE 1 TABLET BY MOUTH DAILY (BEFORE BREAKFAST). , Disp: 90 Tablet, Rfl: 3    pantoprazole (PROTONIX) 40 mg tablet, TAKE 1 TABLET EVERY DAY, Disp: 90 Tablet, Rfl: 3    lisinopriL (PRINIVIL, ZESTRIL) 5 mg tablet, TAKE 1 TABLET EVERY DAY, Disp: 90 Tablet, Rfl: 3    tamsulosin (FLOMAX) 0.4 mg capsule, TAKE 2 CAPSULES BY MOUTH DAILY. , Disp: 180 Capsule, Rfl: 3    meloxicam (MOBIC) 15 mg tablet, TAKE 1 TABLET EVERY DAY, Disp: 90 Tablet, Rfl: 3    atorvastatin (LIPITOR) 20 mg tablet, TAKE 1 TABLET EVERY NIGHT, Disp: 90 Tablet, Rfl: 3    nitroglycerin (NITROSTAT) 0.4 mg SL tablet, 1 Tab by SubLINGual route every five (5) minutes as needed for Chest Pain. Do not exceed 3 in 24 hours. , Disp: 1 Bottle, Rfl: 3    acetaminophen (TYLENOL) 325 mg tablet, Take 2 Tabs by mouth every four (4) hours as needed for Pain or Fever (For temp greater than or equal to 38.5 C or 101.3 F (Unless hepatic failure or contrindicated). Give first line for fever. )., Disp: 30 Tab, Rfl: 0    magnesium oxide (MAG-OX) 400 mg tablet, Take 400 mg by mouth daily. , Disp: , Rfl:     multivitamin (ONE A DAY) tablet, Take 1 Tab by mouth daily. , Disp: , Rfl:     omega-3 fatty acids-vitamin e 1,000 mg cap, Take 1 Cap by mouth daily. , Disp: , Rfl:

## 2023-05-25 RX ORDER — TAMSULOSIN HYDROCHLORIDE 0.4 MG/1
0.8 CAPSULE ORAL DAILY
COMMUNITY
Start: 2022-09-13

## 2023-05-25 RX ORDER — ATENOLOL 50 MG/1
50 TABLET ORAL DAILY
COMMUNITY
Start: 2023-01-04 | End: 2023-06-20 | Stop reason: SDUPTHER

## 2023-05-25 RX ORDER — ATORVASTATIN CALCIUM 20 MG/1
1 TABLET, FILM COATED ORAL NIGHTLY
COMMUNITY
Start: 2022-09-13

## 2023-05-25 RX ORDER — MELOXICAM 15 MG/1
1 TABLET ORAL DAILY
COMMUNITY
Start: 2022-09-13

## 2023-05-25 RX ORDER — PANTOPRAZOLE SODIUM 40 MG/1
1 TABLET, DELAYED RELEASE ORAL DAILY
COMMUNITY
Start: 2022-09-13

## 2023-05-25 RX ORDER — LISINOPRIL 5 MG/1
1 TABLET ORAL DAILY
COMMUNITY
Start: 2022-09-13

## 2023-05-25 RX ORDER — ACETAMINOPHEN 325 MG/1
650 TABLET ORAL EVERY 4 HOURS PRN
COMMUNITY
Start: 2018-12-29

## 2023-05-25 RX ORDER — MAGNESIUM OXIDE 400 MG/1
400 TABLET ORAL DAILY
COMMUNITY

## 2023-05-25 RX ORDER — NITROGLYCERIN 0.4 MG/1
0.4 TABLET SUBLINGUAL
COMMUNITY
Start: 2021-01-08

## 2023-05-25 RX ORDER — LEVOTHYROXINE SODIUM 0.12 MG/1
125 TABLET ORAL
COMMUNITY
Start: 2022-09-13

## 2023-06-16 ENCOUNTER — TELEPHONE (OUTPATIENT)
Age: 78
End: 2023-06-16

## 2023-06-16 DIAGNOSIS — R00.0 TACHYCARDIA: Primary | ICD-10-CM

## 2023-06-20 RX ORDER — ATENOLOL 50 MG/1
50 TABLET ORAL DAILY
Qty: 90 TABLET | Refills: 3 | Status: SHIPPED | OUTPATIENT
Start: 2023-06-20

## 2023-06-20 NOTE — TELEPHONE ENCOUNTER
Per VO by Dr. Eze Mendoza as Dr. Rocky Goodman is out of the office today.     Future Appointments   Date Time Provider Valeriano Richardson   8/31/2023  2:20 PM MD SKINNY Espino [facility]

## 2023-08-31 ENCOUNTER — OFFICE VISIT (OUTPATIENT)
Age: 78
End: 2023-08-31
Payer: MEDICARE

## 2023-08-31 VITALS
DIASTOLIC BLOOD PRESSURE: 62 MMHG | OXYGEN SATURATION: 97 % | WEIGHT: 174 LBS | HEIGHT: 66 IN | SYSTOLIC BLOOD PRESSURE: 134 MMHG | HEART RATE: 74 BPM | BODY MASS INDEX: 27.97 KG/M2

## 2023-08-31 DIAGNOSIS — E78.2 MIXED HYPERLIPIDEMIA: ICD-10-CM

## 2023-08-31 DIAGNOSIS — I10 ESSENTIAL (PRIMARY) HYPERTENSION: ICD-10-CM

## 2023-08-31 DIAGNOSIS — R00.0 TACHYCARDIA: ICD-10-CM

## 2023-08-31 DIAGNOSIS — I25.118 ATHEROSCLEROTIC HEART DISEASE OF NATIVE CORONARY ARTERY WITH OTHER FORMS OF ANGINA PECTORIS (HCC): Primary | ICD-10-CM

## 2023-08-31 PROCEDURE — 3078F DIAST BP <80 MM HG: CPT | Performed by: SPECIALIST

## 2023-08-31 PROCEDURE — 99214 OFFICE O/P EST MOD 30 MIN: CPT | Performed by: SPECIALIST

## 2023-08-31 PROCEDURE — 1036F TOBACCO NON-USER: CPT | Performed by: SPECIALIST

## 2023-08-31 PROCEDURE — G8427 DOCREV CUR MEDS BY ELIG CLIN: HCPCS | Performed by: SPECIALIST

## 2023-08-31 PROCEDURE — G8419 CALC BMI OUT NRM PARAM NOF/U: HCPCS | Performed by: SPECIALIST

## 2023-08-31 PROCEDURE — 3075F SYST BP GE 130 - 139MM HG: CPT | Performed by: SPECIALIST

## 2023-08-31 PROCEDURE — 1123F ACP DISCUSS/DSCN MKR DOCD: CPT | Performed by: SPECIALIST

## 2023-08-31 RX ORDER — ATENOLOL 25 MG/1
25 TABLET ORAL DAILY
Qty: 90 TABLET | Refills: 3 | Status: SHIPPED | OUTPATIENT
Start: 2023-08-31

## 2023-08-31 ASSESSMENT — PATIENT HEALTH QUESTIONNAIRE - PHQ9
SUM OF ALL RESPONSES TO PHQ9 QUESTIONS 1 & 2: 0
1. LITTLE INTEREST OR PLEASURE IN DOING THINGS: 0
SUM OF ALL RESPONSES TO PHQ QUESTIONS 1-9: 0
2. FEELING DOWN, DEPRESSED OR HOPELESS: 0

## 2023-08-31 NOTE — PROGRESS NOTES
Lj Wise     1945       Nilo Rincon MD, [facility] [place]  Date of Visit-8/31/2023   PCP is Jorge Lombardi MD   [facility] [place]  [facility]  HPI:  James Robison is a 70 y.o. male     Patient doing generally well. He gets chest pain but has not had it for several months. It is pretty consistently only with heavy levels of exertion so he avoids that. When he was in [facility] he did not feel any discomfort. The last time it happens when he went to the dentist.  This is similar to the pain he has had for the last 4 to 5 years and which led to the cath in 2021 that showed open coronaries. We suspect that it is nonvascularized  small vessel ,branch vessel disease. Overall wise he feels stable. He did have an episode of back pain and started to notice a lower blood pressure. He cut his atenolol in half and things went fine with that and no longer had any dizziness. He has no current chest pain the last 2 months palpitations dizziness shortness of breath fever or syncope. He gets occasional leg pain. Overall he is doing well. Now Denies chest pain, edema, syncope or shortness of breath at rest   Has no tachycardia , palpitations or sense of arrythmia       Hx of CABG in 2005. Prior TIA on Plavix. He saw Dr. Rose Hidalgo until 2015, though the pt had told the surgeon he had not seen a cardiologist in 10 years. Our notes indicate 3 vessel CABG July 2005. Cath in August 2009 with open Maty Dynes to LAD, open SVG to PDA, but the PDA was closed at the insertion site, open SVG to OM1. Echo 12/28/18 EF 65-70%. lipoma resection in July. Pt underwent cath 1/20/21 all grafts were open and LVEDP was 16. Holter for tachycardia 11/30/2022 to 12/6/2022 showed a heart rate of 89. Rare PVCs. No pauses. SVT x3 with fast at 146    Assessment/Plan:     Patient Instructions   We will see you back for an annual follow up.       1. Coronary artery disease

## 2024-06-15 DIAGNOSIS — R00.0 TACHYCARDIA: ICD-10-CM

## 2024-06-18 RX ORDER — ATENOLOL 25 MG/1
25 TABLET ORAL DAILY
Qty: 90 TABLET | Refills: 3 | Status: SHIPPED | OUTPATIENT
Start: 2024-06-18

## 2024-06-18 NOTE — TELEPHONE ENCOUNTER
VO per MD    Future Appointments   Date Time Provider Department Center   8/22/2024  8:40 AM Nilo Osborn MD [facility]

## (undated) DEVICE — KIT MFLD ISOLATN NACL CNTRST PRT TBNG SPIK W/ PRSS TRNSDUC

## (undated) DEVICE — STERILE POLYISOPRENE POWDER-FREE SURGICAL GLOVES WITH EMOLLIENT COATING: Brand: PROTEXIS

## (undated) DEVICE — INFECTION CONTROL KIT SYS

## (undated) DEVICE — COLON CLOSING PACK: Brand: MEDLINE INDUSTRIES, INC.

## (undated) DEVICE — TUBING HYDR IRR --

## (undated) DEVICE — 3M™ TEGADERM™ TRANSPARENT FILM DRESSING FRAME STYLE, 1626W, 4 IN X 4-3/4 IN (10 CM X 12 CM), 50/CT 4CT/CASE: Brand: 3M™ TEGADERM™

## (undated) DEVICE — SUTURE VCRL SZ 2-0 L27IN ABSRB UD L26MM SH 1/2 CIR J417H

## (undated) DEVICE — Device

## (undated) DEVICE — NEEDLE HYPO 22GA L1.5IN BLK S STL HUB POLYPR SHLD REG BVL

## (undated) DEVICE — PINNACLE INTRODUCER SHEATH: Brand: PINNACLE

## (undated) DEVICE — KIT HND CTRL 3 W STPCOCK ROT END 54IN PREM HI PRSS TBNG AT

## (undated) DEVICE — CANISTER, RIGID, 3000CC: Brand: MEDLINE INDUSTRIES, INC.

## (undated) DEVICE — SUTURE MCRYL SZ 4-0 L27IN ABSRB UD L19MM PS-2 1/2 CIR PRIM Y426H

## (undated) DEVICE — FORCEPS BX L240CM JAW DIA2.8MM L CAP W/ NDL MIC MESH TOOTH

## (undated) DEVICE — WASTE KIT - ST MARY: Brand: MEDLINE INDUSTRIES, INC.

## (undated) DEVICE — VISUALIZATION SYSTEM: Brand: CLEARIFY

## (undated) DEVICE — ROCKER SWITCH PENCIL BLADE ELECTRODE, HOLSTER: Brand: EDGE

## (undated) DEVICE — SPONGE GZ W4XL4IN COT 12 PLY TYP VII WVN C FLD DSGN

## (undated) DEVICE — SUTURE PDS II SZ 4-0 L27IN ABSRB VLT L17MM RB-1 1/2 CIR Z304H

## (undated) DEVICE — SUTURE PERMAHAND SZ 3-0 L18IN NONABSORBABLE BLK L26MM SH C013D

## (undated) DEVICE — ANGIOGRAPHIC CATHETER: Brand: IMPULSE™

## (undated) DEVICE — TROCAR: Brand: KII® SLEEVE

## (undated) DEVICE — PREP SKN CHLRAPRP APL 26ML STR --

## (undated) DEVICE — LAPAROSCOPIC TROCAR SLEEVE/SINGLE USE: Brand: KII® OPTICAL ACCESS SYSTEM

## (undated) DEVICE — YANKAUER,TAPERED BULBOUS TIP,W/O VENT: Brand: MEDLINE

## (undated) DEVICE — SUTURE VCRL SZ 3-0 L27IN ABSRB UD L26MM SH 1/2 CIR J416H

## (undated) DEVICE — PACK,BASIC,SIRUS,V: Brand: MEDLINE

## (undated) DEVICE — REM POLYHESIVE ADULT PATIENT RETURN ELECTRODE: Brand: VALLEYLAB

## (undated) DEVICE — RELOAD STPL L60MM H1.5-3.6MM REG TISS BLU GRIPPING SURF B

## (undated) DEVICE — KIT MED IMAG CNTRST AGNT W/ IOPAMIDOL REUSE

## (undated) DEVICE — GARMENT,MEDLINE,DVT,INT,CALF,MED, GEN2: Brand: MEDLINE

## (undated) DEVICE — RELOAD STPL L60MM H1-2.6MM MESENTERY THN TISS WHT 6 ROW

## (undated) DEVICE — SOLUTION IV 1000ML 0.9% SOD CHL

## (undated) DEVICE — DRAPE,LAPAROTOMY,T,PEDI,STERILE: Brand: MEDLINE

## (undated) DEVICE — TOWEL SURG W17XL27IN STD BLU COT NONFENESTRATED PREWASHED

## (undated) DEVICE — SUTURE SZ 0 27IN 5/8 CIR UR-6  TAPER PT VIOLET ABSRB VICRYL J603H

## (undated) DEVICE — STAPLER INT L34CM 60MM LNG ENDOSCP ARTC PWR + ECHELON FLX

## (undated) DEVICE — SUTURE PDS II SZ 1 L27IN ABSRB VLT CT-1 L36MM 1/2 CIR Z341H

## (undated) DEVICE — DRAPE,REIN 53X77,STERILE: Brand: MEDLINE

## (undated) DEVICE — DERMABOND SKIN ADH 0.7ML -- DERMABOND ADVANCED 12/BX

## (undated) DEVICE — SURGICAL PROCEDURE PACK BASIN MAJ SET CUST NO CAUT

## (undated) DEVICE — SYR 10ML LUER LOK 1/5ML GRAD --

## (undated) DEVICE — NEEDLE INSUF L150MM DIA2MM DISP FOR PNEUMOPERI ENDOPATH

## (undated) DEVICE — COVER LT HNDL PLAS RIG 1 PER PK

## (undated) DEVICE — TOTAL TRAY, 16FR 10ML SIL FOLEY, URN: Brand: MEDLINE

## (undated) DEVICE — STRAP,POSITIONING,KNEE/BODY,FOAM,4X60": Brand: MEDLINE

## (undated) DEVICE — 40580 - THE PINK PAD - ADVANCED TRENDELENBURG POSITIONING KIT: Brand: 40580 - THE PINK PAD - ADVANCED TRENDELENBURG POSITIONING KIT

## (undated) DEVICE — PACK PROCEDURE SURG HRT CATH

## (undated) DEVICE — DRAPE,UTILTY,TAPE,15X26, 4EA/PK: Brand: MEDLINE

## (undated) DEVICE — (D)PREP SKN CHLRAPRP APPL 26ML -- CONVERT TO ITEM 371833

## (undated) DEVICE — ACCESS PLATFORM FOR MINIMALLY INVASIVE SURGERY.: Brand: GELPORT® LAPAROSCOPIC  SYSTEM

## (undated) DEVICE — TUBING INSUF 0.3UM FLTR W/ LUERLOCK CONN

## (undated) DEVICE — TUBING, SUCTION, 1/4" X 12', STRAIGHT: Brand: MEDLINE

## (undated) DEVICE — TROCAR: Brand: KII® OPTICAL ACCESS SYSTEM

## (undated) DEVICE — SURGICAL PROCEDURE KIT GEN LAPAROSCOPY LF

## (undated) DEVICE — ANGIO-SEAL VIP VASCULAR CLOSURE DEVICE: Brand: ANGIO-SEAL

## (undated) DEVICE — STERILE-Z MAYO STAND COVERS CLEAR POLYETHYLENE STERILE UNIVERSAL FIT 20 PER CASE: Brand: STERILE-Z